# Patient Record
Sex: FEMALE | Race: WHITE | HISPANIC OR LATINO | URBAN - METROPOLITAN AREA
[De-identification: names, ages, dates, MRNs, and addresses within clinical notes are randomized per-mention and may not be internally consistent; named-entity substitution may affect disease eponyms.]

---

## 2024-05-13 ENCOUNTER — APPOINTMENT (OUTPATIENT)
Dept: URGENT CARE | Facility: MEDICAL CENTER | Age: 54
End: 2024-05-13

## 2024-05-13 ENCOUNTER — APPOINTMENT (OUTPATIENT)
Dept: LAB | Facility: MEDICAL CENTER | Age: 54
End: 2024-05-13

## 2024-09-05 ENCOUNTER — HOSPITAL ENCOUNTER (OUTPATIENT)
Dept: RADIOLOGY | Facility: HOSPITAL | Age: 54
Discharge: HOME | End: 2024-09-05
Payer: COMMERCIAL

## 2024-09-05 ENCOUNTER — OFFICE VISIT (OUTPATIENT)
Dept: INTERNAL MEDICINE CLINIC | Facility: CLINIC | Age: 54
End: 2024-09-05
Payer: COMMERCIAL

## 2024-09-05 VITALS
WEIGHT: 140.2 LBS | HEIGHT: 65 IN | SYSTOLIC BLOOD PRESSURE: 122 MMHG | BODY MASS INDEX: 23.36 KG/M2 | DIASTOLIC BLOOD PRESSURE: 80 MMHG | TEMPERATURE: 96.4 F | OXYGEN SATURATION: 96 % | HEART RATE: 96 BPM

## 2024-09-05 DIAGNOSIS — G43.009 MIGRAINE WITHOUT AURA, NOT REFRACTORY: ICD-10-CM

## 2024-09-05 DIAGNOSIS — E03.9 ACQUIRED HYPOTHYROIDISM: ICD-10-CM

## 2024-09-05 DIAGNOSIS — M25.511 CHRONIC PAIN OF BOTH SHOULDERS: Primary | ICD-10-CM

## 2024-09-05 DIAGNOSIS — M47.812 SPONDYLOSIS OF CERVICAL REGION WITHOUT MYELOPATHY OR RADICULOPATHY: ICD-10-CM

## 2024-09-05 DIAGNOSIS — G89.29 CHRONIC PAIN OF BOTH SHOULDERS: Primary | ICD-10-CM

## 2024-09-05 DIAGNOSIS — M25.511 CHRONIC PAIN OF BOTH SHOULDERS: ICD-10-CM

## 2024-09-05 DIAGNOSIS — M81.0 AGE-RELATED OSTEOPOROSIS WITHOUT CURRENT PATHOLOGICAL FRACTURE: ICD-10-CM

## 2024-09-05 DIAGNOSIS — Z79.899 ENCOUNTER FOR LONG-TERM CURRENT USE OF MEDICATION: ICD-10-CM

## 2024-09-05 DIAGNOSIS — M25.512 CHRONIC PAIN OF BOTH SHOULDERS: Primary | ICD-10-CM

## 2024-09-05 DIAGNOSIS — M25.512 CHRONIC PAIN OF BOTH SHOULDERS: ICD-10-CM

## 2024-09-05 DIAGNOSIS — N60.02 BILATERAL BREAST CYSTS: ICD-10-CM

## 2024-09-05 DIAGNOSIS — N60.01 BILATERAL BREAST CYSTS: ICD-10-CM

## 2024-09-05 DIAGNOSIS — G89.29 CHRONIC PAIN OF BOTH SHOULDERS: ICD-10-CM

## 2024-09-05 DIAGNOSIS — Z00.00 LABORATORY EXAMINATION ORDERED AS PART OF A ROUTINE GENERAL MEDICAL EXAMINATION: ICD-10-CM

## 2024-09-05 DIAGNOSIS — Z71.9 HEALTH COUNSELING: ICD-10-CM

## 2024-09-05 DIAGNOSIS — Z12.31 ENCOUNTER FOR SCREENING MAMMOGRAM FOR BREAST CANCER: ICD-10-CM

## 2024-09-05 DIAGNOSIS — M51.36 LUMBAR DEGENERATIVE DISC DISEASE: ICD-10-CM

## 2024-09-05 PROBLEM — I73.00 RAYNAUD'S DISEASE WITHOUT GANGRENE: Status: ACTIVE | Noted: 2021-11-29

## 2024-09-05 PROBLEM — Z90.710 HISTORY OF TOTAL HYSTERECTOMY: Status: ACTIVE | Noted: 2017-08-16

## 2024-09-05 PROBLEM — Z98.890 S/P BILATERAL BLEPHAROPLASTY: Status: ACTIVE | Noted: 2023-04-19

## 2024-09-05 PROBLEM — M51.369 LUMBAR DEGENERATIVE DISC DISEASE: Status: ACTIVE | Noted: 2024-09-05

## 2024-09-05 PROCEDURE — 99204 OFFICE O/P NEW MOD 45 MIN: CPT | Performed by: INTERNAL MEDICINE

## 2024-09-05 PROCEDURE — 73030 X-RAY EXAM OF SHOULDER: CPT

## 2024-09-05 RX ORDER — RIZATRIPTAN BENZOATE 5 MG/1
5 TABLET ORAL ONCE AS NEEDED
Qty: 9 TABLET | Refills: 0 | Status: SHIPPED | OUTPATIENT
Start: 2024-09-05

## 2024-09-05 RX ORDER — LEVOTHYROXINE SODIUM 125 UG/1
125 TABLET ORAL DAILY
Qty: 90 TABLET | Refills: 0 | Status: SHIPPED | OUTPATIENT
Start: 2024-09-05

## 2024-09-05 RX ORDER — ALENDRONATE SODIUM 70 MG/1
70 TABLET ORAL WEEKLY
COMMUNITY
Start: 2023-11-03 | End: 2024-11-02

## 2024-09-05 RX ORDER — LEVOTHYROXINE SODIUM 125 MCG
TABLET ORAL
COMMUNITY
Start: 1993-07-05 | End: 2024-09-05

## 2024-09-05 NOTE — PROGRESS NOTES
Ambulatory Visit  Name: Yesenia Newberry      : 1970      MRN: [id number]  Encounter Provider: Sabrina Joel MD  Encounter Date: 2024   Encounter department: [facility]    Assessment & Plan   1. Chronic pain of both shoulders  Assessment & Plan:  Differential Dx: OA, tendonitis, rotator cuff tear, overuse.  Refer to physical therapy.  Discussed Ortho referral.  Orders:  -     XR shoulder 2+ vw right; Future; Expected date: 2024  -     XR shoulder 2+ vw left; Future; Expected date: 2024  -     Ambulatory Referral to Physical Therapy; Future  2. Age-related osteoporosis without current pathological fracture  Assessment & Plan:  On alendronate.  Continue daily walks and supplements.  Orders:  -     Comprehensive metabolic panel; Future; Expected date: 2025  -     Vitamin D 25 hydroxy; Future; Expected date: 2025  -     DXA bone density spine hip and pelvis; Future; Expected date: 2025  3. Migraine without aura, not refractory  Assessment & Plan:  Previously taking notriptyline, stopped a week ago.  Monitor headaches.  Change rescue medication to Maxalt, as requested.  Recommend to start Mg, B2.  Orders:  -     rizatriptan (MAXALT) 5 mg tablet; Take 1 tablet (5 mg total) by mouth once as needed for migraine may repeat in 2 hours if necessary  4. Acquired hypothyroidism  Assessment & Plan:  Adequately replaced.  Orders:  -     CBC and differential; Future; Expected date: 2025  -     Lipid panel; Future; Expected date: 2025  -     TSH, 3rd generation with Free T4 reflex; Future; Expected date: 2025  -     levothyroxine 125 mcg tablet; Take 1 tablet (125 mcg total) by mouth daily  5. Bilateral breast cysts  -     Mammo diagnostic bilateral w 3d and cad; Future  -     US breast bilateral limited (diagnostic); Future; Expected date: 2024  6. Lumbar degenerative disc disease  Assessment & Plan:  No recent symptoms.  Recommend  regular stretching exercises.  7. Spondylosis of cervical region without myelopathy or radiculopathy  Assessment & Plan:  As above.  8. Health counseling  Comments:  Colonoscopy updated, will check records.  9. Encounter for long-term current use of medication  -     Vitamin B12; Future; Expected date: 01/01/2025  10. Laboratory examination ordered as part of a routine general medical examination  -     CBC and differential; Future; Expected date: 01/01/2025  -     Comprehensive metabolic panel; Future; Expected date: 01/01/2025  -     Lipid panel; Future; Expected date: 01/01/2025  -     TSH, 3rd generation with Free T4 reflex; Future; Expected date: 01/01/2025  -     Vitamin B12; Future; Expected date: 01/01/2025  -     Vitamin D 25 hydroxy; Future; Expected date: 01/01/2025  -     Hemoglobin A1C; Future; Expected date: 01/01/2025  11. Encounter for screening mammogram for breast cancer  -     Mammo diagnostic bilateral w 3d and cad; Future  -     US breast bilateral limited (diagnostic); Future; Expected date: 09/05/2024    Follow up in 1 year or as needed.       History of Present Illness     She complains of bilateral shoulder pain. This started almost a year ago.  She thinks it is due to her repetitive motions. She hurt her right shoulder first then her left shoulder a few months later. She has full range of motion but has pain with certain movements. No known injury or falls. She did not have it checked then, says it is not that bothersome.    She reports having migraines when she was young, disappeared during pregnancy. She started having headaches again during menopause. She was started on nortriptyline which had helped. She ran out about a week ago, no headaches yet. She is asking if she still needs the nortriptyline.   She reports her hot flashes are better, sleeping better.    She is requesting a diagnostic mammogram with ultrasounds for both breasts.  She has a history of multiple cysts, always does  this every year so she does not need to be recalled frequently.    Her family has osteoporosis, diagnosed with it recently as well.  She started alendronate earlier this year.      Review of Systems   Constitutional:  Negative for appetite change and fatigue.   HENT:  Negative for congestion, ear pain and postnasal drip.    Eyes:  Negative for visual disturbance.   Respiratory:  Negative for cough and shortness of breath.    Cardiovascular:  Negative for chest pain and leg swelling.   Gastrointestinal:  Negative for abdominal pain, constipation and diarrhea.   Genitourinary:  Negative for dysuria, frequency and urgency.   Musculoskeletal:  Positive for arthralgias. Negative for myalgias.   Skin:  Negative for rash and wound.   Neurological:  Negative for dizziness, numbness and headaches.   Hematological:  Does not bruise/bleed easily.   Psychiatric/Behavioral:  Negative for confusion. The patient is not nervous/anxious.        Past Medical History:   Diagnosis Date   • Arthritis    • Disease of thyroid gland     Hypothyroidism   • Headache(784.0)     Migraine     Past Surgical History:   Procedure Laterality Date   • HYSTERECTOMY       Family History   Problem Relation Age of Onset   • Thyroid disease Mother    • COPD Mother    • Arthritis Mother    • Glaucoma Mother    • Lung cancer Mother    • Thyroid disease Daughter    • Asthma Daughter    • Hearing loss Maternal Grandmother    • Diabetes Maternal Grandfather    • Hypertension Maternal Grandfather    • Breast cancer Paternal Aunt      Social History     Socioeconomic History   • Marital status: /Civil Union     Spouse name: Not on file   • Number of children: Not on file   • Years of education: Not on file   • Highest education level: Not on file   Occupational History   • Not on file   Tobacco Use   • Smoking status: Never     Passive exposure: Never   • Smokeless tobacco: Never   Substance and Sexual Activity   • Alcohol use: Yes     Alcohol/week: 2.0  standard drinks of alcohol     Types: 2 Glasses of wine per week     Comment: Socially   • Drug use: Never   • Sexual activity: Yes     Partners: Male     Birth control/protection: Post-menopausal   Other Topics Concern   • Not on file   Social History Narrative        3 daughters, 3 grand daughters    2 dogs, cat    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     Social Determinants of Health     Financial Resource Strain: Low Risk  (4/18/2023)    [facility]    Overall Financial Resource Strain (CARDIA)    • Difficulty of Paying Living Expenses: Not hard at all   Food Insecurity: No Food Insecurity (4/18/2023)    [facility]    Hunger Vital Sign    • Worried About Running Out of Food in the Last Year: Never true    • Ran Out of Food in the Last Year: Never true   Transportation Needs: No Transportation Needs (4/18/2023)    [facility]    PRAPARE - Transportation    • Lack of Transportation (Medical): No    • Lack of Transportation (Non-Medical): No   Physical Activity: Not on file   Stress: No Stress Concern Present (4/18/2023)    [facility]    [employer] - Occupational Stress Questionnaire    • Feeling of Stress : Not at all   Social Connections: Unknown (4/18/2023)    [facility]    Social Connection and Isolation Panel [NHANES]    • Frequency of Communication with Friends and Family: More than three times a week    • Frequency of Social Gatherings with Friends and Family: More than three times a week    • Attends Restorationist Services: Patient declined    • Active Member of Clubs or Organizations: No    • Attends Club or Organization Meetings: Never    • Marital Status:    Intimate Partner  "Violence: Not At Risk (4/18/2023)    [facility]    Humiliation, Afraid, Rape, and Kick questionnaire    • Fear of Current or Ex-Partner: No    • Emotionally Abused: No    • Physically Abused: No    • Sexually Abused: No   Housing Stability: Low Risk  (4/18/2023)    [facility]    Housing Stability Vital Sign    • Unable to Pay for Housing in the Last Year: No    • Number of Places Lived in the Last Year: 1    • Unstable Housing in the Last Year: No       Current Outpatient Medications:   •  alendronate (FOSAMAX) 70 mg tablet, Take 70 mg by mouth Once a week, Disp: , Rfl:   •  levothyroxine 125 mcg tablet, Take 1 tablet (125 mcg total) by mouth daily, Disp: 90 tablet, Rfl: 0  •  rizatriptan (MAXALT) 5 mg tablet, Take 1 tablet (5 mg total) by mouth once as needed for migraine may repeat in 2 hours if necessary, Disp: 9 tablet, Rfl: 0  Allergies   Allergen Reactions   • Sulfa Antibiotics Eye Swelling, Facial Swelling, Lip Swelling and Rash     Abnormal labs  Wbc drops, neutrophils dropped   lvhcc   • Sulfamethoxazole-Trimethoprim Rash     rash,eye, mouth swelling, labs elevated         Objective     /80   Pulse 96   Temp (!) 96.4 °F (35.8 °C)   Ht 5' 5\" (1.651 m)   Wt 63.6 kg (140 lb 3.2 oz)   SpO2 96%   BMI 23.33 kg/m²     Physical Exam  Vitals and nursing note reviewed.   Constitutional:       General: She is not in acute distress.     Appearance: She is well-developed.   HENT:      Head: Normocephalic and atraumatic.      Right Ear: Tympanic membrane, ear canal and external ear normal.      Left Ear: Tympanic membrane, ear canal and external ear normal.      Mouth/Throat:      Mouth: Mucous membranes are moist.   Eyes:      Pupils: Pupils are equal, round, and reactive to light.   Cardiovascular:      Rate and Rhythm: Normal rate and regular rhythm.      Heart sounds: Normal heart " sounds.   Pulmonary:      Effort: Pulmonary effort is normal.      Breath sounds: Normal breath sounds. No wheezing.   Abdominal:      General: Bowel sounds are normal.      Palpations: Abdomen is soft.   Musculoskeletal:         General: No swelling.      Right shoulder: No tenderness or bony tenderness. Decreased range of motion.      Left shoulder: No tenderness or bony tenderness. Decreased range of motion.      Cervical back: Spasms present. No tenderness. No pain with movement.      Thoracic back: No spasms or tenderness.      Right lower leg: No edema.      Left lower leg: No edema.   Skin:     General: Skin is warm.      Findings: No rash.   Neurological:      General: No focal deficit present.      Mental Status: She is alert and oriented to person, place, and time.   Psychiatric:         Mood and Affect: Mood and affect normal. Mood is not anxious or depressed.         Behavior: Behavior normal.       Administrative Statements       Reviewed available records.

## 2024-09-05 NOTE — ASSESSMENT & PLAN NOTE
Differential Dx: OA, tendonitis, rotator cuff tear, overuse.  Refer to physical therapy.  Discussed Ortho referral.

## 2024-09-05 NOTE — ASSESSMENT & PLAN NOTE
Previously taking notriptyline, stopped a week ago.  Monitor headaches.  Change rescue medication to Maxalt, as requested.  Recommend to start Mg, B2.

## 2024-09-05 NOTE — PATIENT INSTRUCTIONS
For migraine headaches:    Start magnesium oxide 400 mg twice a day.  Start vitamin B2 100 mg twice a day.

## 2024-09-12 ENCOUNTER — EVALUATION (OUTPATIENT)
Dept: PHYSICAL THERAPY | Facility: CLINIC | Age: 54
End: 2024-09-12
Payer: COMMERCIAL

## 2024-09-12 DIAGNOSIS — S49.91XD INJURY OF BOTH SHOULDERS, SUBSEQUENT ENCOUNTER: Primary | ICD-10-CM

## 2024-09-12 DIAGNOSIS — S49.92XD INJURY OF BOTH SHOULDERS, SUBSEQUENT ENCOUNTER: Primary | ICD-10-CM

## 2024-09-12 PROCEDURE — 97162 PT EVAL MOD COMPLEX 30 MIN: CPT

## 2024-09-12 PROCEDURE — 97530 THERAPEUTIC ACTIVITIES: CPT

## 2024-09-12 NOTE — PROGRESS NOTES
PT Evaluation     Today's date: 2024  Patient name: Yesenia Newberry  : 1970  MRN: [id number]  Referring provider: Sabrina Joel MD  Dx:   Encounter Diagnosis     ICD-10-CM    1. Injury of both shoulders, subsequent encounter  S49.91XD     S49.92XD                      Assessment  Impairments: abnormal coordination, abnormal muscle firing, abnormal or restricted ROM, abnormal movement, activity intolerance, impaired physical strength, lacks appropriate home exercise program, pain with function and scapular dyskinesis    Assessment details: Yesenia Newberry is a pleasant 53 y.o. female presents with signs and symptoms consistent with:   Rotator cuff tendinopathy     Problem List:  1) Hypomobility of posterior capsule   2) impaired motor control    Comparable signs:  1) reaching behind back  2) reaching overhead    she has hypomobility of posterior capsule, impaired motor control and increased tenderness at deltoid insertion indicative of a rotator cuff tendonosis resulting in worry over not knowing what's wrong, wanting to avoid painkillers, wanting to avoid surgery, and fear of not being able to keep active. These impairments listed above are preventing the patient from participating in functional activity. No further referral appears necessary at this time based upon examination results, and is negative for any red flags. Prognosis is good given HEP compliance and attendance to physical therapy 2x a week.  Positive prognostic indicators include positive attitude toward recovery and good understanding of diagnosis and treatment plan options.  Negative prognostic indicators include hypothyroidism.  Patent will benefit from skilled physical therapy at this time to address deficits to improve overall function and return to PLOF. Patient verbalized understanding of POC, HEP, and return demonstrated HEP. All questions were answered to patients satisfaction.     Please contact me if you have any questions  or recommendations. Thank you for the referral and the opportunity to share in Yesenia Newberry's care.        Understanding of Dx/Px/POC: good     Prognosis: good    Goals  Impairment Goals 4-6 weeks  In order to improve and return to PLOF patient will be able to...  - Decrease pain frequency/intensity/duration to 2/10  - Demonstrate symmetrical shoulder ROM with non involved shoulder without compensations  - Increase shoulder strength to 5/5 throughout  - Increase scapular strength to 5/5 throughout      Functional Goals 6-8 weeks  In order to improve and return to PLOF will be able to...  - Participate in functional activities with no greater than 2/10 pain.  - Increase Functional Status Measure (FOTO) to: predicted outcomes  - Be independent and compliant with HEP  - Participate in functional activities with good motor control.  - Reach overhead without increased pain/compensation/difficulty  - Reach behind back without increased pain/compensation/difficulty   - Wash hair without increased pain/compensation/difficulty             Plan  Patient would benefit from: skilled PT  Planned modality interventions: cryotherapy and electrical stimulation/Russian stimulation    Planned therapy interventions: joint mobilization, manual therapy, neuromuscular re-education, patient education, strengthening, stretching, therapeutic activities, therapeutic exercise, home exercise program, functional ROM exercises and postural training    Frequency: 2x week (2-3x week)  Duration in weeks: 4  Treatment plan discussed with: patient      Subjective Evaluation    History of Present Illness  Mechanism of injury: Patient presents to PT today with bilateral shoulder pain. 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 She remembers in December she had done something to the R  shoulder during a movement and felt it afterward and then January she did the same thing to her left. L>R and notes challenge with reaching behind her back and reaching behind. She reports overhead is not bad, and no decrease in strength and pain in outside shoulder.   Patient Goals  Patient goals for therapy: decreased pain, independence with ADLs/IADLs and return to sport/leisure activities  Patient goal: make sure that it is nothing crazy and not doing more damage  Pain  Current pain ratin  At worst pain ratin  Location: outsdie of the shoulder  Quality: sharp  Aggravating factors: overhead activity    Hand dominance: right        Objective     Postural Observations  Seated posture: fair  Standing posture: fair      Tenderness     Left Shoulder   Tenderness in the supraspinatus tendon.     Right Shoulder  Tenderness in the supraspinatus tendon.     Cervical/Thoracic Screen   Cervical range of motion within normal limits  Thoracic range of motion within normal limits  Thoracic range of motion within normal limits with the following exceptions: Mild hypomobility in t/s extension    Neurological Testing     Sensation     Shoulder   Left Shoulder   Intact: light touch    Right Shoulder   Intact: Light touch    Reflexes   Left   Biceps (C5/C6): normal (2+)  Brachioradialis (C6): normal (2+)  Triceps (C7): normal (2+)  Huddleston's reflex: negative    Right   Biceps (C5/C6): normal (2+)  Brachioradialis (C6): normal (2+)  Triceps (C7): normal (2+)  Huddleston's reflex: negative    Active Range of Motion   Left Shoulder   Flexion: 160 degrees   Abduction: 160 degrees   External rotation 90°: 80 degrees   External rotation BTH: T3 with pain  Internal rotation 90°: 60 degrees   Internal rotation BTB: T7 with pain    Right Shoulder   Flexion: 160 degrees   Abduction: 160 degrees   External rotation 90°: 80 degrees  External rotation BTH: T3 with pain  Internal rotation 90°: 65 degrees   Internal rotation BTB: T7 with  pain    Joint Play   Left Shoulder  Hypomobile in the posterior capsule and thoracic spine.    Right Shoulder  Hypomobile in the posterior capsule and thoracic spine.     Strength/Myotome Testing     Left Shoulder     Planes of Motion   Flexion: 4   Abduction: 4   External rotation at 0°: 4   Internal rotation at 0°: 4     Isolated Muscles   Biceps: 5   Lower trapezius: 4   Middle trapezius: 4   Serratus anterior: 4     Right Shoulder     Planes of Motion   Flexion: 4+   Abduction: 4-   External rotation at 0°: 4   Internal rotation at 0°: 4     Isolated Muscles   Biceps: 5   Lower trapezius: 4   Middle trapezius: 4   Serratus anterior: 4     Tests     Left Shoulder   Positive Hawkin's.   Negative belly press, drop arm, empty can, external rotation lag sign, full can, horn blower, internal rotation lag sign, Speed's, ULTT1, ULTT2, ULTT3 and ULTT4.     Right Shoulder   Positive Hawkin's.   Negative belly press, drop arm, empty can, external rotation lag sign, full can, Speed's, ULTT1, ULTT2, ULTT3 and ULTT4.                POC Expires Auth Status Start Date Expiration Date PT Visit Limit    10/12 N/a 9/12 10/12 BOMN   Date        Used        Remaining           Diagnosis:  Bilateral RTC tendinopathy    Precautions:  None   Comparable signs 1) reaching behind  2) overhead   Primary Impairments: 1) posterior capsule  2) Impaired motor control    Patient Goals Make sure nothing is wrong with it   Manual Therapy  9/12        Post capsule                                    Re-evaluation          Exercise Diary          Therapeutic Exercise         Strap stretch         Sleeper stretch         T/s extension                                             Neuromuscular Re-education         ER to the lord          Serratus slides                                                               Therapeutic Activities         Education  POC, diagnosis, expecations                                            Modalities

## 2024-09-20 ENCOUNTER — APPOINTMENT (OUTPATIENT)
Dept: PHYSICAL THERAPY | Facility: CLINIC | Age: 54
End: 2024-09-20
Payer: COMMERCIAL

## 2024-09-26 ENCOUNTER — OFFICE VISIT (OUTPATIENT)
Dept: PHYSICAL THERAPY | Facility: CLINIC | Age: 54
End: 2024-09-26
Payer: COMMERCIAL

## 2024-09-26 DIAGNOSIS — M25.511 CHRONIC PAIN OF BOTH SHOULDERS: ICD-10-CM

## 2024-09-26 DIAGNOSIS — S49.92XD INJURY OF BOTH SHOULDERS, SUBSEQUENT ENCOUNTER: Primary | ICD-10-CM

## 2024-09-26 DIAGNOSIS — S49.91XD INJURY OF BOTH SHOULDERS, SUBSEQUENT ENCOUNTER: Primary | ICD-10-CM

## 2024-09-26 DIAGNOSIS — M25.512 CHRONIC PAIN OF BOTH SHOULDERS: ICD-10-CM

## 2024-09-26 DIAGNOSIS — G89.29 CHRONIC PAIN OF BOTH SHOULDERS: ICD-10-CM

## 2024-09-26 PROCEDURE — 97112 NEUROMUSCULAR REEDUCATION: CPT

## 2024-09-26 PROCEDURE — 97110 THERAPEUTIC EXERCISES: CPT

## 2024-09-26 PROCEDURE — 97530 THERAPEUTIC ACTIVITIES: CPT

## 2024-09-26 NOTE — PROGRESS NOTES
"Daily Note     Today's date: 2024  Patient name: Yesenia Newberry  : 1970  MRN: [id number]  Referring provider: Sabrina Joel MD  Dx:   Encounter Diagnosis     ICD-10-CM    1. Injury of both shoulders, subsequent encounter  S49.91XD     S49.92XD       2. Chronic pain of both shoulders  M25.511 Ambulatory Referral to Physical Therapy    G89.29     M25.512                      Subjective: Patient reports doing the home program is feeling better.       Objective: See treatment diary below      Assessment: Tolerated treatment well. Patient demonstrated fatigue post treatment, exhibited good technique with therapeutic exercises, and would benefit from continued PT Patient HEP updated for home and will be d/c from skilled PT at this time. She would just prefer to work with HEP. Reviewed and educated on HEP with good return demos.       Plan: Continue per plan of care.  Progress treatment as tolerated.         POC Expires Auth Status Start Date Expiration Date PT Visit Limit    10/12 N/a 9/12 10/12 BOMN   Date        Used        Remaining           Diagnosis:  Bilateral RTC tendinopathy    Precautions:  None   Comparable signs 1) reaching behind  2) overhead   Primary Impairments: 1) posterior capsule  2) Impaired motor control    Patient Goals Make sure nothing is wrong with it   Manual Therapy         Post capsule                                    Re-evaluation          Exercise Diary          Therapeutic Exercise         Strap stretch  2x10 tamra       Sleeper stretch  10x10\" tamra       T/s extension  20x 5\"                                           Neuromuscular Re-education         ER to the lord   2x10       Serratus slides  2x10                                                             Therapeutic Activities         Education  POC, diagnosis, expecations HEP                                            Modalities                            "

## 2024-10-04 DIAGNOSIS — M81.0 AGE-RELATED OSTEOPOROSIS WITHOUT CURRENT PATHOLOGICAL FRACTURE: Primary | ICD-10-CM

## 2024-10-04 RX ORDER — ALENDRONATE SODIUM 70 MG/1
70 TABLET ORAL
Qty: 12 TABLET | Refills: 1 | Status: SHIPPED | OUTPATIENT
Start: 2024-10-04 | End: 2025-10-04

## 2024-10-04 NOTE — TELEPHONE ENCOUNTER
Medication Refill Request     Name Fosamax   Dose/Frequency 70 mg, take 1 tab once a week  Quantity 4  Verified pharmacy   [x]  Verified ordering Provider   [x]  Does patient have enough for the next 3 days? Yes [] No [x] First request was sent to wrong office

## 2024-12-29 DIAGNOSIS — E03.9 ACQUIRED HYPOTHYROIDISM: ICD-10-CM

## 2024-12-30 RX ORDER — LEVOTHYROXINE SODIUM 125 UG/1
125 TABLET ORAL DAILY
Qty: 90 TABLET | Refills: 1 | Status: SHIPPED | OUTPATIENT
Start: 2024-12-30

## 2025-01-06 DIAGNOSIS — M81.0 AGE-RELATED OSTEOPOROSIS WITHOUT CURRENT PATHOLOGICAL FRACTURE: ICD-10-CM

## 2025-01-07 RX ORDER — ALENDRONATE SODIUM 70 MG/1
70 TABLET ORAL
Qty: 12 TABLET | Refills: 0 | Status: SHIPPED | OUTPATIENT
Start: 2025-01-07 | End: 2026-01-07

## 2025-03-21 DIAGNOSIS — M81.0 AGE-RELATED OSTEOPOROSIS WITHOUT CURRENT PATHOLOGICAL FRACTURE: ICD-10-CM

## 2025-03-21 RX ORDER — ALENDRONATE SODIUM 70 MG/1
70 TABLET ORAL
Qty: 12 TABLET | Refills: 1 | Status: SHIPPED | OUTPATIENT
Start: 2025-03-21 | End: 2026-03-21

## 2025-03-25 ENCOUNTER — OFFICE VISIT (OUTPATIENT)
Dept: OBGYN CLINIC | Facility: CLINIC | Age: 55
End: 2025-03-25
Payer: COMMERCIAL

## 2025-03-25 VITALS — HEIGHT: 65 IN | BODY MASS INDEX: 22.16 KG/M2 | WEIGHT: 133 LBS

## 2025-03-25 DIAGNOSIS — G89.29 CHRONIC RIGHT SHOULDER PAIN: ICD-10-CM

## 2025-03-25 DIAGNOSIS — M12.811 RIGHT ROTATOR CUFF TEAR ARTHROPATHY: Primary | ICD-10-CM

## 2025-03-25 DIAGNOSIS — M75.101 RIGHT ROTATOR CUFF TEAR ARTHROPATHY: Primary | ICD-10-CM

## 2025-03-25 DIAGNOSIS — M25.511 CHRONIC RIGHT SHOULDER PAIN: ICD-10-CM

## 2025-03-25 PROCEDURE — 99204 OFFICE O/P NEW MOD 45 MIN: CPT | Performed by: PHYSICAL MEDICINE & REHABILITATION

## 2025-03-25 NOTE — PROGRESS NOTES
1. Right rotator cuff tear arthropathy    2. Chronic right shoulder pain      Orders Placed This Encounter   Procedures    MRI shoulder right wo contrast     No orders of the defined types were placed in this encounter.      Impression:  Right shoulder pain likely secondary to partial rotator cuff tear and bicep tendinitis.  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  She has tried activity modification, physical therapy and home exercise program.  She continues to be significantly impacted with this with range of motion limitation and a positive drop arm.  In light of this, we will obtain an MRI of her right shoulder.  Patient wants to hold off on medication for now.  I will see her back for MRI review.    Imaging Studies (I personally reviewed images in PACS and report):  Right shoulder x-rays most recent to this encounter reviewed.  These images show minimal glenohumeral joint osteoarthritis.    No follow-ups on file.    Patient is in agreement with the above plan.    HPI:  Yesenia Newberry is a 54 y.o. female  who presents for evaluation of   Chief Complaint   Patient presents with    Right Arm - Pain     Patient has pain at the base of her shoulder the goes to the inside of her elbow. Patient had an injury last year in the same shoulder.       Onset/Mechanism: Chronic pain for over 3 months.  No recent injury.  Location: In the arm.  Radiation: Down the arm.  Provocative: Reaching motions when it first started.  Severity: Pain that is not improving.  Associated Symptoms: Had left shoulder pain but this has resolved.  Treatment so far: Physical therapy which had helped initially.    Following history reviewed and updated:  Past Medical History:   Diagnosis Date    Arthritis     Disease of thyroid gland     Hypothyroidism    Headache(784.0)     Migraine     Past Surgical History:   Procedure Laterality Date    HYSTERECTOMY      US BREAST CYST ASPIRATION LEFT  "INITIAL Left 1/25/2012     Social History   Social History     Substance and Sexual Activity   Alcohol Use Yes    Alcohol/week: 2.0 standard drinks of alcohol    Types: 2 Glasses of wine per week    Comment: Socially     Social History     Substance and Sexual Activity   Drug Use Never     Social History     Tobacco Use   Smoking Status Never    Passive exposure: Never   Smokeless Tobacco Never     Family History   Problem Relation Age of Onset    Thyroid disease Mother     COPD Mother     Arthritis Mother     Glaucoma Mother     Lung cancer Mother     Thyroid disease Daughter     Asthma Daughter     Hearing loss Maternal Grandmother     Diabetes Maternal Grandfather     Hypertension Maternal Grandfather     Breast cancer Paternal Aunt      Allergies   Allergen Reactions    Sulfa Antibiotics Eye Swelling, Facial Swelling, Lip Swelling and Rash     Abnormal labs  Wbc drops, neutrophils dropped   lvhcc    Sulfamethoxazole-Trimethoprim Rash     rash,eye, mouth swelling, labs elevated        Constitutional:  Ht 5' 5\" (1.651 m)   Wt 60.3 kg (133 lb)   BMI 22.13 kg/m²    General: NAD.  Eyes: Anicteric sclerae.  Neck: Supple.  Lungs: Unlabored breathing.  Cardiovascular: No lower extremity edema.  Skin: Intact without erythema.  Neurologic: Sensation intact to light touch.  Psychiatric: Mood and affect are appropriate.    Right Shoulder Exam     Tenderness   The patient is experiencing tenderness in the acromion and biceps tendon.    Range of Motion   Active abduction:  100 abnormal   External rotation:  abnormal   Forward flexion:  130 abnormal     Tests   Kathleen test: positive  Impingement: positive  Drop arm: positive    Other   Erythema: absent  Scars: absent  Sensation: normal  Pulse: present             Procedures              "

## 2025-03-26 DIAGNOSIS — M12.811 RIGHT ROTATOR CUFF TEAR ARTHROPATHY: Primary | ICD-10-CM

## 2025-03-26 DIAGNOSIS — M75.101 RIGHT ROTATOR CUFF TEAR ARTHROPATHY: Primary | ICD-10-CM

## 2025-03-26 RX ORDER — MELOXICAM 15 MG/1
15 TABLET ORAL DAILY PRN
Qty: 60 TABLET | Refills: 0 | Status: SHIPPED | OUTPATIENT
Start: 2025-03-26

## 2025-03-28 ENCOUNTER — TELEPHONE (OUTPATIENT)
Dept: OBGYN CLINIC | Facility: HOSPITAL | Age: 55
End: 2025-03-28

## 2025-03-28 ENCOUNTER — HOSPITAL ENCOUNTER (OUTPATIENT)
Dept: RADIOLOGY | Facility: IMAGING CENTER | Age: 55
End: 2025-03-28
Payer: COMMERCIAL

## 2025-03-28 DIAGNOSIS — M75.101 RIGHT ROTATOR CUFF TEAR ARTHROPATHY: ICD-10-CM

## 2025-03-28 DIAGNOSIS — M12.811 RIGHT ROTATOR CUFF TEAR ARTHROPATHY: ICD-10-CM

## 2025-03-28 PROCEDURE — 73221 MRI JOINT UPR EXTREM W/O DYE: CPT

## 2025-03-28 NOTE — TELEPHONE ENCOUNTER
Caller: Leti    Doctor: Dr. Lopes    Reason for call: Patient received her results for her Shoulder MRI, she stated it says she has a tear. Wanted to get in with you sooner. I advised you have nothing sooner, I advised since I am not a nurse I would leave a message to see if you can look. I advised if that is the case Moses is not a surgeon and I will send a message to see who you would want her to FU with.     Patient would like a call back.     Call back#: [phone]

## 2025-03-31 ENCOUNTER — OFFICE VISIT (OUTPATIENT)
Dept: OBGYN CLINIC | Facility: CLINIC | Age: 55
End: 2025-03-31
Payer: COMMERCIAL

## 2025-03-31 VITALS — HEIGHT: 65 IN | BODY MASS INDEX: 22.16 KG/M2 | WEIGHT: 133 LBS

## 2025-03-31 DIAGNOSIS — G89.29 CHRONIC RIGHT SHOULDER PAIN: Primary | ICD-10-CM

## 2025-03-31 DIAGNOSIS — M25.511 CHRONIC RIGHT SHOULDER PAIN: Primary | ICD-10-CM

## 2025-03-31 PROCEDURE — 20610 DRAIN/INJ JOINT/BURSA W/O US: CPT | Performed by: PHYSICAL MEDICINE & REHABILITATION

## 2025-03-31 PROCEDURE — 99213 OFFICE O/P EST LOW 20 MIN: CPT | Performed by: PHYSICAL MEDICINE & REHABILITATION

## 2025-03-31 RX ORDER — TRIAMCINOLONE ACETONIDE 40 MG/ML
80 INJECTION, SUSPENSION INTRA-ARTICULAR; INTRAMUSCULAR
Status: COMPLETED | OUTPATIENT
Start: 2025-03-31 | End: 2025-03-31

## 2025-03-31 RX ORDER — PREDNISONE 20 MG/1
40 TABLET ORAL
Qty: 10 TABLET | Refills: 0 | Status: SHIPPED | OUTPATIENT
Start: 2025-03-31 | End: 2025-04-05

## 2025-03-31 RX ORDER — ROPIVACAINE HYDROCHLORIDE 5 MG/ML
10 INJECTION, SOLUTION EPIDURAL; INFILTRATION; PERINEURAL
Status: COMPLETED | OUTPATIENT
Start: 2025-03-31 | End: 2025-03-31

## 2025-03-31 RX ADMIN — ROPIVACAINE HYDROCHLORIDE 10 ML: 5 INJECTION, SOLUTION EPIDURAL; INFILTRATION; PERINEURAL at 13:30

## 2025-03-31 RX ADMIN — TRIAMCINOLONE ACETONIDE 80 MG: 40 INJECTION, SUSPENSION INTRA-ARTICULAR; INTRAMUSCULAR at 13:30

## 2025-03-31 NOTE — PROGRESS NOTES
"1. Chronic right shoulder pain  Large joint arthrocentesis    Ambulatory referral to Physical Therapy    predniSONE 20 mg tablet        Orders Placed This Encounter   Procedures    Large joint arthrocentesis    Ambulatory referral to Physical Therapy        Impression:  Patient is here in follow up of right shoulder pain likely secondary to rotator cuff tendinosis.  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  She has tried activity modification, physical therapy and home exercise program.  We reviewed her MRI today.  We proceeded with a subacromial space steroid injection.  Will have her start formal physical therapy.  I will see her back in 3-4 weeks to reassess.     Imaging Studies (I personally reviewed images in PACS and report):  Right shoulder x-rays most recent to this encounter reviewed.  These images show minimal glenohumeral joint osteoarthritis.    MRI right shoulder:  \"SUBCUTANEOUS TISSUES: Normal     JOINT EFFUSION: There is a small glenohumeral joint effusion.     ACROMION PROCESS: Normal.     ROTATOR CUFF: Subscapularis insertional tendinosis with a tear involving the inferior bundle of the subscapularis tendon exhibiting minimal retraction without secondary signs of chronicity. Prominent supraspinatus and infraspinatus insertional tendinosis   without tear.     SUBACROMIAL/SUBDELTOID BURSA: There is mild subacromial/subdeltoid bursitis.     LONG HEAD OF BICEPS TENDON: Normal.     GLENOID LABRUM: Mild degenerative fraying at the superior glenoid labrum with remainder of the labrum appearing intact.     GLENOHUMERAL JOINT: Moderate degenerative change.     ACROMIOCLAVICULAR JOINT: There is moderate osteoarthritis.     BONES: Normal.     IMPRESSION:     1.  Subscapularis, supraspinatus and infraspinatus insertional tendinosis with a tear of the inferior bundle of the subscapularis tendon exhibiting minimal tendon retraction without secondary signs " "of chronicity.  2.  Intact long head biceps tendon.  3.  Glenohumeral degenerative change with degenerative fraying at the superior glenoid labrum with remainder of the labrum appearing intact.\"    No follow-ups on file.    Patient is in agreement with the above plan.    HPI:  Yesenia Newberry is a 54 y.o. female  who presents in follow up.  Here for   Chief Complaint   Patient presents with    Right Arm - Pain, Follow-up       Since last visit: See above.    Following history reviewed and updated:  Past Medical History:   Diagnosis Date    Arthritis     Disease of thyroid gland     Hypothyroidism    Headache(784.0)     Migraine     Past Surgical History:   Procedure Laterality Date    HYSTERECTOMY      US BREAST CYST ASPIRATION LEFT INITIAL Left 1/25/2012     Social History   Social History     Substance and Sexual Activity   Alcohol Use Yes    Alcohol/week: 2.0 standard drinks of alcohol    Types: 2 Glasses of wine per week    Comment: Socially     Social History     Substance and Sexual Activity   Drug Use Never     Social History     Tobacco Use   Smoking Status Never    Passive exposure: Never   Smokeless Tobacco Never     Family History   Problem Relation Age of Onset    Thyroid disease Mother     COPD Mother     Arthritis Mother     Glaucoma Mother     Lung cancer Mother     Thyroid disease Daughter     Asthma Daughter     Hearing loss Maternal Grandmother     Diabetes Maternal Grandfather     Hypertension Maternal Grandfather     Breast cancer Paternal Aunt      Allergies   Allergen Reactions    Sulfa Antibiotics Eye Swelling, Facial Swelling, Lip Swelling and Rash     Abnormal labs  Wbc drops, neutrophils dropped   lvhcc    Sulfamethoxazole-Trimethoprim Rash     rash,eye, mouth swelling, labs elevated        Constitutional:  Ht 5' 4.96\" (1.65 m)   Wt 60.3 kg (133 lb)   BMI 22.16 kg/m²    General: NAD.  Eyes: Clear sclerae.  ENT: No inflammation, lesion, or mass of lips.  No tracheal " deviation.  Musculoskeletal: As mentioned below.  Integumentary: No visible rashes or skin lesions.  Pulmonary/Chest: Effort normal. No respiratory distress.   Neuro: CN's grossly intact, ADLER.  Psych: Normal affect and judgement.  Vascular: WWP.    Right Shoulder Exam     Tenderness   The patient is experiencing tenderness in the acromion.    Range of Motion   Active abduction:  abnormal   Passive abduction:  normal   Forward flexion:  abnormal     Tests   Kathleen test: positive  Impingement: positive    Other   Erythema: absent  Scars: absent  Sensation: normal  Pulse: present    Comments:  ROM improved after injection.             Large joint arthrocentesis  Universal Protocol:  procedure performed by consultantConsent: Verbal consent obtained. Written consent not obtained.  Risks and benefits: risks, benefits and alternatives were discussed  Consent given by: patient  Timeout called at: 3/31/2025 1:36 PM.  Patient understanding: patient states understanding of the procedure being performed  Site marked: the operative site was marked  Patient identity confirmed: verbally with patient  Supporting Documentation  Indications: pain   Procedure Details  Location: shoulder -   Needle gauge: 21G 2''  Ultrasound guidance: no  Approach: posterolateral  Medications administered: 80 mg triamcinolone acetonide 40 mg/mL; 10 mL ropivacaine 0.5 %    Patient tolerance: patient tolerated the procedure well with no immediate complications  Dressing:  Sterile dressing applied    There was little to no resistance encountered during the injection.    Risks of this procedure include:    - Risk of bleeding since a needle is involved.  - Risk of infection (1/10,000 chance as per recent studies).  Signs/symptoms were discussed and they would prompt an urgent evaluation at an emergency department.  - Risk of pigmentation or skin dimpling in the skin (2-3% chance as per recent studies) from the steroid.  - Risk of increased pain from  steroid flare (1% chance as per recent studies) that typically lasts 24-48 hours.  - Risk of increased blood sugars from the steroid medication that can last for a few weeks.  If the patient is a diabetic or pre-diabetic, they were encouraged to closely monitor their blood sugars and discuss with PCP if elevated more than usual or if having symptoms.    The benefits outweigh the risks and so the procedure was completed.

## 2025-04-02 ENCOUNTER — TELEPHONE (OUTPATIENT)
Dept: ADMINISTRATIVE | Facility: OTHER | Age: 55
End: 2025-04-02

## 2025-04-02 NOTE — TELEPHONE ENCOUNTER
Upon review of the In Basket request and the patient's chart, initial outreach has been made via fax to facility. Please see Contacts section for details.     Thank you  Keyur Zee MA

## 2025-04-02 NOTE — LETTER
Procedure Request Form: Colonoscopy      Date Requested: 25  Patient: Yesenia Notte  Patient : 1970   Referring Provider: Sabrina Joel MD        Date of Procedure ______________________________       The above patient has informed us that they have completed their   most recent Colonoscopy at your facility. Please complete   this form and attach all corresponding procedure reports/results.    Comments _Approx 202_________________________________________________________  ____________________________________________________________________  ____________________________________________________________________  ____________________________________________________________________    Facility Completing Procedure _________________________________________    Form Completed By (print name) _______________________________________      Signature __________________________________________________________      These reports are needed for  compliance.    Please fax this completed form and a copy of the procedure report to the [facility] Based Department as soon as possible via Fax [phone], xochitl Baer: Phone [phone]. Our office is located at [address] [place] [address].    We thank you for your assistance in treating our mutual patient.

## 2025-04-07 NOTE — TELEPHONE ENCOUNTER
Upon review of the In Basket request we were able to locate, review, and update the patient chart as requested for CRC: Colonoscopy.    Any additional questions or concerns should be emailed to the Practice Liaisons via the appropriate education email address, please do not reply via In Basket.    Thank you  Keyur Zee MA   [facility]

## 2025-04-22 ENCOUNTER — OFFICE VISIT (OUTPATIENT)
Dept: OBGYN CLINIC | Facility: CLINIC | Age: 55
End: 2025-04-22
Payer: COMMERCIAL

## 2025-04-22 VITALS — WEIGHT: 133 LBS | BODY MASS INDEX: 22.16 KG/M2 | HEIGHT: 65 IN

## 2025-04-22 DIAGNOSIS — M25.511 CHRONIC RIGHT SHOULDER PAIN: Primary | ICD-10-CM

## 2025-04-22 DIAGNOSIS — G89.29 CHRONIC RIGHT SHOULDER PAIN: Primary | ICD-10-CM

## 2025-04-22 PROCEDURE — 99213 OFFICE O/P EST LOW 20 MIN: CPT | Performed by: PHYSICAL MEDICINE & REHABILITATION

## 2025-04-22 NOTE — PROGRESS NOTES
"1. Chronic right shoulder pain          No orders of the defined types were placed in this encounter.       Impression:  Patient is here in follow up of right shoulder pain likely secondary to rotator cuff tendinosis.  Patient has had the symptoms over the past few months.  Treatment has included subacromial space steroid injection, activity modification, physical therapy and home exercise program.  She is now doing very well.  She has full strength with resisted shoulder internal rotation and external rotation.  She has full range of motion and minimal discomfort with impingement testing.  She has returned to all physical activity.  She is having some paresthesias in a radial nerve distribution along the forearm.  No motor deficits.  If continued symptoms, could consider hand therapy.  I will see her back if needed.     Imaging Studies (I personally reviewed images in PACS and report):  Right shoulder x-rays most recent to this encounter reviewed.  These images show minimal glenohumeral joint osteoarthritis.     MRI right shoulder:  \"SUBCUTANEOUS TISSUES: Normal     JOINT EFFUSION: There is a small glenohumeral joint effusion.     ACROMION PROCESS: Normal.     ROTATOR CUFF: Subscapularis insertional tendinosis with a tear involving the inferior bundle of the subscapularis tendon exhibiting minimal retraction without secondary signs of chronicity. Prominent supraspinatus and infraspinatus insertional tendinosis   without tear.     SUBACROMIAL/SUBDELTOID BURSA: There is mild subacromial/subdeltoid bursitis.     LONG HEAD OF BICEPS TENDON: Normal.     GLENOID LABRUM: Mild degenerative fraying at the superior glenoid labrum with remainder of the labrum appearing intact.     GLENOHUMERAL JOINT: Moderate degenerative change.     ACROMIOCLAVICULAR JOINT: There is moderate osteoarthritis.     BONES: Normal.     IMPRESSION:     1.  Subscapularis, supraspinatus and infraspinatus insertional tendinosis with a tear of the " "inferior bundle of the subscapularis tendon exhibiting minimal tendon retraction without secondary signs of chronicity.  2.  Intact long head biceps tendon.  3.  Glenohumeral degenerative change with degenerative fraying at the superior glenoid labrum with remainder of the labrum appearing intact.\"    No follow-ups on file.    Patient is in agreement with the above plan.    HPI:  Yesenia Newberry is a 54 y.o. female  who presents in follow up.  Here for   Chief Complaint   Patient presents with    Right Shoulder - Follow-up, Pain     Pt reports she is feeling much better, still has some intermittent pain        Since last visit: See above.    Following history reviewed and updated:  Past Medical History:   Diagnosis Date    Arthritis     Disease of thyroid gland     Hypothyroidism    Headache(784.0)     Migraine     Past Surgical History:   Procedure Laterality Date    HYSTERECTOMY      US BREAST CYST ASPIRATION LEFT INITIAL Left 1/25/2012     Social History   Social History     Substance and Sexual Activity   Alcohol Use Yes    Alcohol/week: 2.0 standard drinks of alcohol    Types: 2 Glasses of wine per week    Comment: Socially     Social History     Substance and Sexual Activity   Drug Use Never     Social History     Tobacco Use   Smoking Status Never    Passive exposure: Never   Smokeless Tobacco Never     Family History   Problem Relation Age of Onset    Thyroid disease Mother     COPD Mother     Arthritis Mother     Glaucoma Mother     Lung cancer Mother     Thyroid disease Daughter     Asthma Daughter     Hearing loss Maternal Grandmother     Diabetes Maternal Grandfather     Hypertension Maternal Grandfather     Breast cancer Paternal Aunt      Allergies   Allergen Reactions    Sulfa Antibiotics Eye Swelling, Facial Swelling, Lip Swelling and Rash     Abnormal labs  Wbc drops, neutrophils dropped   lvhcc    Sulfamethoxazole-Trimethoprim Rash     rash,eye, mouth swelling, labs elevated        Constitutional:  " "Ht 5' 4.96\" (1.65 m)   Wt 60.3 kg (133 lb)   BMI 22.16 kg/m²    General: NAD.  Eyes: Clear sclerae.  ENT: No inflammation, lesion, or mass of lips.  No tracheal deviation.  Musculoskeletal: As mentioned below.  Integumentary: No visible rashes or skin lesions.  Pulmonary/Chest: Effort normal. No respiratory distress.   Neuro: CN's grossly intact, ADLER.  Psych: Normal affect and judgement.  Vascular: WWP.    Right Shoulder Exam     Tenderness   The patient is experiencing no tenderness.    Range of Motion   The patient has normal right shoulder ROM.    Muscle Strength   The patient has normal right shoulder strength.    Other   Erythema: absent  Scars: absent  Sensation: normal  Pulse: present             Procedures  "

## 2025-05-15 ENCOUNTER — APPOINTMENT (OUTPATIENT)
Dept: LAB | Facility: CLINIC | Age: 55
End: 2025-05-15
Payer: COMMERCIAL

## 2025-05-15 ENCOUNTER — APPOINTMENT (OUTPATIENT)
Dept: LAB | Facility: CLINIC | Age: 55
End: 2025-05-15
Attending: INTERNAL MEDICINE
Payer: COMMERCIAL

## 2025-05-15 ENCOUNTER — TRANSCRIBE ORDERS (OUTPATIENT)
Dept: LAB | Facility: CLINIC | Age: 55
End: 2025-05-15

## 2025-05-15 DIAGNOSIS — Z79.899 ENCOUNTER FOR LONG-TERM CURRENT USE OF MEDICATION: ICD-10-CM

## 2025-05-15 DIAGNOSIS — Z00.8 HEALTH EXAMINATION IN POPULATION SURVEYS: ICD-10-CM

## 2025-05-15 DIAGNOSIS — E03.9 ACQUIRED HYPOTHYROIDISM: ICD-10-CM

## 2025-05-15 DIAGNOSIS — Z00.00 LABORATORY EXAMINATION ORDERED AS PART OF A ROUTINE GENERAL MEDICAL EXAMINATION: ICD-10-CM

## 2025-05-15 DIAGNOSIS — Z00.8 HEALTH EXAMINATION IN POPULATION SURVEYS: Primary | ICD-10-CM

## 2025-05-15 DIAGNOSIS — M81.0 AGE-RELATED OSTEOPOROSIS WITHOUT CURRENT PATHOLOGICAL FRACTURE: ICD-10-CM

## 2025-05-15 LAB
25(OH)D3 SERPL-MCNC: 32 NG/ML (ref 30–100)
ALBUMIN SERPL BCG-MCNC: 4.2 G/DL (ref 3.5–5)
ALP SERPL-CCNC: 67 U/L (ref 34–104)
ALT SERPL W P-5'-P-CCNC: 30 U/L (ref 7–52)
ANION GAP SERPL CALCULATED.3IONS-SCNC: 5 MMOL/L (ref 4–13)
AST SERPL W P-5'-P-CCNC: 19 U/L (ref 13–39)
BASOPHILS # BLD AUTO: 0.05 THOUSANDS/ÂΜL (ref 0–0.1)
BASOPHILS NFR BLD AUTO: 1 % (ref 0–1)
BILIRUB SERPL-MCNC: 0.47 MG/DL (ref 0.2–1)
BUN SERPL-MCNC: 12 MG/DL (ref 5–25)
CALCIUM SERPL-MCNC: 8.8 MG/DL (ref 8.4–10.2)
CHLORIDE SERPL-SCNC: 108 MMOL/L (ref 96–108)
CHOLEST SERPL-MCNC: 237 MG/DL (ref ?–200)
CHOLEST SERPL-MCNC: 242 MG/DL (ref ?–200)
CO2 SERPL-SCNC: 30 MMOL/L (ref 21–32)
CREAT SERPL-MCNC: 0.87 MG/DL (ref 0.6–1.3)
EOSINOPHIL # BLD AUTO: 0.19 THOUSAND/ÂΜL (ref 0–0.61)
EOSINOPHIL NFR BLD AUTO: 4 % (ref 0–6)
ERYTHROCYTE [DISTWIDTH] IN BLOOD BY AUTOMATED COUNT: 12.8 % (ref 11.6–15.1)
EST. AVERAGE GLUCOSE BLD GHB EST-MCNC: 140 MG/DL
EST. AVERAGE GLUCOSE BLD GHB EST-MCNC: 143 MG/DL
GFR SERPL CREATININE-BSD FRML MDRD: 74 ML/MIN/1.73SQ M
GLUCOSE P FAST SERPL-MCNC: 91 MG/DL (ref 65–99)
HBA1C MFR BLD: 6.5 %
HBA1C MFR BLD: 6.6 %
HCT VFR BLD AUTO: 42.3 % (ref 34.8–46.1)
HDLC SERPL-MCNC: 82 MG/DL
HDLC SERPL-MCNC: 84 MG/DL
HGB BLD-MCNC: 13.5 G/DL (ref 11.5–15.4)
IMM GRANULOCYTES # BLD AUTO: 0.01 THOUSAND/UL (ref 0–0.2)
IMM GRANULOCYTES NFR BLD AUTO: 0 % (ref 0–2)
LDLC SERPL CALC-MCNC: 142 MG/DL (ref 0–100)
LDLC SERPL CALC-MCNC: 144 MG/DL (ref 0–100)
LYMPHOCYTES # BLD AUTO: 2.56 THOUSANDS/ÂΜL (ref 0.6–4.47)
LYMPHOCYTES NFR BLD AUTO: 56 % (ref 14–44)
MCH RBC QN AUTO: 31.4 PG (ref 26.8–34.3)
MCHC RBC AUTO-ENTMCNC: 31.9 G/DL (ref 31.4–37.4)
MCV RBC AUTO: 98 FL (ref 82–98)
MONOCYTES # BLD AUTO: 0.38 THOUSAND/ÂΜL (ref 0.17–1.22)
MONOCYTES NFR BLD AUTO: 8 % (ref 4–12)
NEUTROPHILS # BLD AUTO: 1.42 THOUSANDS/ÂΜL (ref 1.85–7.62)
NEUTS SEG NFR BLD AUTO: 31 % (ref 43–75)
NONHDLC SERPL-MCNC: 155 MG/DL
NONHDLC SERPL-MCNC: 158 MG/DL
NRBC BLD AUTO-RTO: 0 /100 WBCS
PLATELET # BLD AUTO: 381 THOUSANDS/UL (ref 149–390)
PMV BLD AUTO: 10.8 FL (ref 8.9–12.7)
POTASSIUM SERPL-SCNC: 4.1 MMOL/L (ref 3.5–5.3)
PROT SERPL-MCNC: 6.5 G/DL (ref 6.4–8.4)
RBC # BLD AUTO: 4.3 MILLION/UL (ref 3.81–5.12)
SODIUM SERPL-SCNC: 143 MMOL/L (ref 135–147)
TRIGL SERPL-MCNC: 64 MG/DL (ref ?–150)
TRIGL SERPL-MCNC: 69 MG/DL (ref ?–150)
TSH SERPL DL<=0.05 MIU/L-ACNC: 1.05 UIU/ML (ref 0.45–4.5)
VIT B12 SERPL-MCNC: 318 PG/ML (ref 180–914)
WBC # BLD AUTO: 4.61 THOUSAND/UL (ref 4.31–10.16)

## 2025-05-15 PROCEDURE — 83036 HEMOGLOBIN GLYCOSYLATED A1C: CPT

## 2025-05-15 PROCEDURE — 80053 COMPREHEN METABOLIC PANEL: CPT

## 2025-05-15 PROCEDURE — 84443 ASSAY THYROID STIM HORMONE: CPT

## 2025-05-15 PROCEDURE — 82306 VITAMIN D 25 HYDROXY: CPT

## 2025-05-15 PROCEDURE — 80061 LIPID PANEL: CPT

## 2025-05-15 PROCEDURE — 82607 VITAMIN B-12: CPT

## 2025-05-15 PROCEDURE — 36415 COLL VENOUS BLD VENIPUNCTURE: CPT

## 2025-05-15 PROCEDURE — 85025 COMPLETE CBC W/AUTO DIFF WBC: CPT

## 2025-05-19 ENCOUNTER — RESULTS FOLLOW-UP (OUTPATIENT)
Dept: INTERNAL MEDICINE CLINIC | Facility: CLINIC | Age: 55
End: 2025-05-19

## 2025-05-29 ENCOUNTER — HOSPITAL ENCOUNTER (OUTPATIENT)
Dept: RADIOLOGY | Facility: HOSPITAL | Age: 55
Discharge: HOME | End: 2025-05-29
Payer: COMMERCIAL

## 2025-05-29 VITALS — BODY MASS INDEX: 21.33 KG/M2 | HEIGHT: 65 IN | WEIGHT: 128 LBS

## 2025-05-29 DIAGNOSIS — N60.02 BILATERAL BREAST CYSTS: ICD-10-CM

## 2025-05-29 DIAGNOSIS — N60.01 BILATERAL BREAST CYSTS: ICD-10-CM

## 2025-05-29 DIAGNOSIS — Z12.31 ENCOUNTER FOR SCREENING MAMMOGRAM FOR BREAST CANCER: ICD-10-CM

## 2025-05-29 PROCEDURE — 77063 BREAST TOMOSYNTHESIS BI: CPT

## 2025-05-29 PROCEDURE — 77067 SCR MAMMO BI INCL CAD: CPT

## 2025-06-11 ENCOUNTER — HOSPITAL ENCOUNTER (OUTPATIENT)
Dept: RADIOLOGY | Facility: HOSPITAL | Age: 55
Discharge: HOME | End: 2025-06-11
Attending: INTERNAL MEDICINE
Payer: COMMERCIAL

## 2025-06-11 DIAGNOSIS — M81.0 AGE-RELATED OSTEOPOROSIS WITHOUT CURRENT PATHOLOGICAL FRACTURE: ICD-10-CM

## 2025-06-11 PROCEDURE — 77080 DXA BONE DENSITY AXIAL: CPT

## 2025-06-12 ENCOUNTER — EVALUATION (OUTPATIENT)
Dept: PHYSICAL THERAPY | Facility: CLINIC | Age: 55
End: 2025-06-12
Attending: PHYSICAL MEDICINE & REHABILITATION
Payer: COMMERCIAL

## 2025-06-12 ENCOUNTER — TELEPHONE (OUTPATIENT)
Dept: FAMILY MEDICINE CLINIC | Facility: CLINIC | Age: 55
End: 2025-06-12

## 2025-06-12 ENCOUNTER — OFFICE VISIT (OUTPATIENT)
Dept: OBGYN CLINIC | Facility: OTHER | Age: 55
End: 2025-06-12
Payer: COMMERCIAL

## 2025-06-12 VITALS — BODY MASS INDEX: 22.16 KG/M2 | WEIGHT: 133 LBS | HEIGHT: 65 IN

## 2025-06-12 DIAGNOSIS — M25.521 RIGHT ELBOW PAIN: ICD-10-CM

## 2025-06-12 DIAGNOSIS — S46.811A FULL THICKNESS TEAR OF RIGHT SUBSCAPULARIS TENDON, INITIAL ENCOUNTER: Primary | ICD-10-CM

## 2025-06-12 DIAGNOSIS — G89.29 CHRONIC RIGHT SHOULDER PAIN: Primary | ICD-10-CM

## 2025-06-12 DIAGNOSIS — M25.511 CHRONIC RIGHT SHOULDER PAIN: Primary | ICD-10-CM

## 2025-06-12 PROCEDURE — 99204 OFFICE O/P NEW MOD 45 MIN: CPT | Performed by: ORTHOPAEDIC SURGERY

## 2025-06-12 PROCEDURE — 97162 PT EVAL MOD COMPLEX 30 MIN: CPT | Performed by: PHYSICAL THERAPIST

## 2025-06-12 PROCEDURE — 97112 NEUROMUSCULAR REEDUCATION: CPT | Performed by: PHYSICAL THERAPIST

## 2025-06-12 RX ORDER — SODIUM CHLORIDE, SODIUM LACTATE, POTASSIUM CHLORIDE, CALCIUM CHLORIDE 600; 310; 30; 20 MG/100ML; MG/100ML; MG/100ML; MG/100ML
20 INJECTION, SOLUTION INTRAVENOUS CONTINUOUS
OUTPATIENT
Start: 2025-06-12

## 2025-06-12 NOTE — ASSESSMENT & PLAN NOTE
The patient does have a small full-thickness tear of her subscapularis and at least on examination appears to have long head biceps tendon instability, this is not seen on the MRI but as a dynamic problem we do not always capture this on an MRI.  Regardless given her persistence of symptoms and the severity of the symptoms, the impact upon her activities of daily living and the relatively acute onset of nature she is indicated for arthroscopic repair of the rotator cuff and likely long head biceps tenodesis.  She has started physical therapy so she will continue to do so as she leads up to the procedure and if her symptoms improve she can consider avoiding the surgery but I do think she will end up having this fixed to improve her overall outcome.    A thorough discussion was performed with the patient regarding the risks and benefit of operative and nonoperative treatment of their rotator cuff tear.  Risks discussed include but were not limited to infection, neurovascular injury, recurrent tear, nonhealing of the repair, need for further surgery, need for biceps tenodesis or tenotomy, stiffness, need for prolonged rehabilitation, as well as the risk of anesthesia.  After this discussion all questions were answered and informed consent was obtained in the office for arthroscopic rotator cuff repair of the right shoulder.  The patient will be scheduled for this procedure accordingly.     Patient's daughter is getting  in October and the [employer] party is in July.  Patient plans on have surgery when she returns from the [employer] party so she is out of the sling for the wedding.  Discussed she can continue PT in the mean-time.    Orders:    Arc 2.0    Ambulatory Referral to Physical Therapy; Future

## 2025-06-12 NOTE — PATIENT INSTRUCTIONS
You are being scheduled for a shoulder arthroscopy to treat your symptoms.  Below are some instructions and information on what to expect before and after your surgery.        Pre-Surgical Preparation for Arthroscopic Shoulder Surgery:     You will be contacted the evening prior to your surgery to confirm the scheduled time of the procedure and when to arrive at the hospital.   Do not eat or drink anything after midnight the night before your surgery.  Since you are having out-patient surgery, make sure that you have someone who can drive you home later in the day.  Also, prepare that person for a long day, as the process of safely preparing for and recovering from the procedure is more time consuming than the actual procedure!      As you will be in a sling after surgery, please wear or bring a loose fitting button-down shirt so that you can easily place this over the sling when you leave the surgical suite.  This avoids having to place the operative arm in a sleeve.  Most patients find that this is the easiest outfit to wear for the first week or so after surgery so you may want to plan accordingly.    Most patients find that lying down in bed after shoulder surgery accentuates their discomfort.  This is likely related to the effect of gravity on the swelling in the shoulder.  As a result, most patients sleep better in a recliner or in bed with pillows propped up behind their back for the first few days or weeks after surgery.  It is a good idea to plan for this ahead of time so there will be less hassle getting things set up the night after surgery.       What to Expect After Arthroscopic Shoulder Surgery:    It is normal to have swelling and discomfort in the shoulder for several days or a week after surgery.  It is also normal to have a small amount of drainage from the surgical wounds (especially the first few days after surgery), as we put fluid into the shoulder to visualize the structures during surgery.   "It is NOT normal to have foul smelling, purulent drainage and if this is noted, please contact the office immediately or proceed to the emergency room for evaluation as this may indicate an infection.     Applying ice bags to the shoulder may help with pain that is not controlled by the regional block.  Ice should be applied 20-30 minutes at a time, every hour or two.  Make sure to put a thin towel or T-shirt next to your skin to avoid direct contact of the ice with the skin. Icing is most helpful in the first 48 hours, although many people find that continuing past this time frame lessens their postoperative pain.  Please note that your post-operative dressing is not conductive to ice, so if you need to, it is okay to remove that dressing even the night after surgery and place band-aids over the wounds in order for the ice to take effect.     Pain Control    Most patients will receive a nerve block, the local anesthetic may keep your whole arm numb for up to 4 days.  You will be given a prescription for narcotic pain medication when you are discharged from the hospital.  With the newer nerve block that is being utilized, patients are rarely requiring the use of this narcotic pain medication.  If you find you do not tolerate that type of pain medicine well, call our office and we will try another one.     In addition to the narcotic pain medication, it is safe to use an anti-inflammatory (unless the patient has a medical condition that would not allow safe use of this mediation).  This includes the Advil, Motrin, Ibuprofen and Alleve category of medications.  Simply follow the over the counter dosing on the package and use as indicated as another adjunct.  Importantly since these medications are all very similar, use only one of them.  Tylenol is a separate medication that can be utilized as well and can be taken at the same time as the other medication or given in a \"staggered\" manner.  Just make sure that you " follow the dosing on the over the counter bottle instructions.  Also make sure that the pain medication prescribed by Dr Heredia's team does not contain acetaminophen (this is found in Percocet and Vicodin).   Typically we do not prescribe those types of pain medications but if for some reason that has been prescribed DO NOT add more Tylenol (acetaminophen) as you could end up taking too much of that medication.    As mentioned above, most patients find that lying down accentuates their discomfort. You might sleep better in a recliner, or propped up in bed.     Dr. Heredia encourages patients to safely ambulate around the house as much as possible in the first few days after the procedure as this can help with blood circulation in the legs. While the incidence of blood clots is very rare following shoulder surgery, early ambulation is a great way to help decrease the already low rate.    24 hours after the surgery you may remove the bandage and cover incisions with Band-Aids if needed. At that time you may shower, the wounds will have a surgical glue that will protect them from shower water but do not submerge your incisions directly (bathing or swimming) until at least 2 weeks post-operatively.  It is safe to let the arm hang at the side and take a shower and put on a shirt without the sling on.  Just make sure that you do not use the operative are to reach out and grab anything as that may damage the repair.  When you are done showering and getting dressed please return the operative arm to the sling.    Unless noted otherwise in your discharge paperwork, Dr. Heredia uses absorbable sutures so they do not need to be removed.    Dr. Heredia’s physician assistant (PA) will see you in the office a few days after the procedure to review the intra-operative findings and to initiate physical therapy if appropriate.  A post-operative appointment should have been scheduled for you already, but if for some reason this did  not happen, please call the office to make one.     Physical therapy is important after nearly all shoulder surgeries and a detailed rehabilitation plan based on the specific intra-operative findings and procedures will be provided to your therapist at the first post-operative office visit.  Most patients have post-operative therapy appointments scheduled pre-operatively, but if you do not, that will be handled at the first post-operative office visit.  Unless expressly directed otherwise it is safe to remove the sling even the first day after the surgery and let the arm hang by the side.  This allows patients to shower and even put a shirt on (bad arm in the sleeve first).  It is also safe to flex and extend their wrist, hand and fingers as much as possible when the block wears off.  These simple motions can serve to pump fluid out of the forearm and decrease swelling in the arm.

## 2025-06-12 NOTE — PROGRESS NOTES
Assessment & Plan  Full thickness tear of right subscapularis tendon, initial encounter    The patient does have a small full-thickness tear of her subscapularis and at least on examination appears to have long head biceps tendon instability, this is not seen on the MRI but as a dynamic problem we do not always capture this on an MRI.  Regardless given her persistence of symptoms and the severity of the symptoms, the impact upon her activities of daily living and the relatively acute onset of nature she is indicated for arthroscopic repair of the rotator cuff and likely long head biceps tenodesis.  She has started physical therapy so she will continue to do so as she leads up to the procedure and if her symptoms improve she can consider avoiding the surgery but I do think she will end up having this fixed to improve her overall outcome.    A thorough discussion was performed with the patient regarding the risks and benefit of operative and nonoperative treatment of their rotator cuff tear.  Risks discussed include but were not limited to infection, neurovascular injury, recurrent tear, nonhealing of the repair, need for further surgery, need for biceps tenodesis or tenotomy, stiffness, need for prolonged rehabilitation, as well as the risk of anesthesia.  After this discussion all questions were answered and informed consent was obtained in the office for arthroscopic rotator cuff repair of the right shoulder.  The patient will be scheduled for this procedure accordingly.     Patient's daughter is getting  in October and the [employer] party is in July.  Patient plans on have surgery when she returns from the [employer] party so she is out of the sling for the wedding.  Discussed she can continue PT in the mean-time.    Orders:    Arc 2.0    Ambulatory Referral to Physical Therapy; Future      Subjective:   Patient ID: Yesenia Newberry is a 54 y.o. female      HPI  The patient presents with a chief  "complaint of right shoulder pain.   The pain began 3 month(s) ago and is associated with an acute injury.  Patient reports on 3/17/25 she was hold back her daughter's rotweiler who was after her cat.  She did have an injury a year ago moving a patient however symptoms had resolved until this recent injury. She was seen and evaluated by Dr Oates who ordered a MRI and later provided with patient with a CS injection and referral to PT.  Patient reports some improvement with the injection and she has just started therapy.  The patient describes the pain as aching, dull, and sharp in intensity,  intermittent in timing, and localizes the pain to the  right globally.  The pain is worse with movement and raising arm over head and relieved by rest.  The pain is not associated with numbness and tingling.  The pain is not associated with constitutional symptoms. The patient is awoken at night by the pain.        The following portions of the patient's history were reviewed and updated as appropriate: allergies, current medications, past family history, past medical history, past social history, past surgical history and problem list.      Objective:  Ht 5' 5\" (1.651 m) Comment: verbal  Wt 60.3 kg (133 lb)   BMI 22.13 kg/m²       Right Shoulder Exam     Range of Motion   External rotation:  80   Forward flexion:  120 (PROM 170)   Right shoulder internal rotation 0 degrees: buttocks.     Muscle Strength   External rotation: 5/5   Supraspinatus: 4/5   Subscapularis: 3/5     Tests   Kathleen test: positive  Impingement: positive    Other   Erythema: absent  Sensation: normal  Pulse: present            Physical Exam  Vitals and nursing note reviewed.   Constitutional:       Appearance: She is well-developed.   HENT:      Head: Normocephalic and atraumatic.     Eyes:      Pupils: Pupils are equal, round, and reactive to light.       Cardiovascular:      Rate and Rhythm: Normal rate and regular rhythm.      Pulses: Normal pulses. "      Heart sounds: Normal heart sounds.   Pulmonary:      Effort: Pulmonary effort is normal. No respiratory distress.      Breath sounds: Normal breath sounds.   Abdominal:      General: Abdomen is flat. There is no distension.      Palpations: Abdomen is soft.     Musculoskeletal:      Cervical back: Normal range of motion and neck supple.     Skin:     General: Skin is warm and dry.     Neurological:      Mental Status: She is alert and oriented to person, place, and time.     Psychiatric:         Mood and Affect: Mood normal.         Behavior: Behavior normal.         Thought Content: Thought content normal.         Judgment: Judgment normal.           I have personally reviewed pertinent films in PACS and my interpretation is as follows.    Right shoulder MRI demonstrates full thickness tear inferior subscapularis, supraspinatus and infraspinatus tendonitis, no biceps instability seen       Records Reviewed: Dr. Victoria's office notes    Scribe Attestation      I,:  Elba Acevedo MA am acting as a scribe while in the presence of the attending physician.:       I,:  Ash Heredia MD personally performed the services described in this documentation    as scribed in my presence.:

## 2025-06-12 NOTE — PROGRESS NOTES
PT Evaluation     Today's date: 2025  Patient name: Yesenia Newberry  : 1970  MRN: [id number]  Referring provider: Sandie Lopes DO  Dx:   Encounter Diagnosis     ICD-10-CM    1. Chronic right shoulder pain  M25.511     G89.29       2. Right elbow pain  M25.521           Start Time: 0915  Stop Time: 1000  Total time in clinic (min): 45 minutes    Assessment  Impairments: abnormal muscle firing, abnormal muscle tone, abnormal or restricted ROM, abnormal movement, impaired physical strength, lacks appropriate home exercise program, pain with function and poor posture     Assessment details: Yesenia Newberry is a pleasant 54 y.o. female who presents with R shoulder and elbow pain following an injury holding her dog back from her cat with arm stretched out to her side. MRI confirmed subscapularis tear. R elbow pain unable to be recreated in evaluation, but numbness possibly from radial tunnel syndrome. The patient's greatest concerns are worry over not knowing what's wrong, concern at no signs of improvement, and fear of not being able to keep active.      No further referral appears necessary at this time based upon examination results.    Primary movement impairment diagnosis of R shoulder AROM resulting in pathoanatomical symptoms of R shoulder and elbow pain and limiting her ability to carry, exercise or recreation, lift, perform household chores, perform yard work, reach overhead, sleep, and wash behind the back.    Impairments include:  1) R shoulder ROM  2) R shoulder strength  3) Posture  4) R elbow/forearm nerve mobility    Etiologic factors include injury holding her dog back from her cat.    Discussed risks, benefits, and alternatives to treatment, and answered all patient questions to patient satisfaction.  Understanding of Dx/Px/POC: good     Prognosis: good    Goals  Impairment Goals 4-6 weeks  - Decrease pain to <3/10  - Improve shoulder AROM to equal to the unaffected upper extremity  -  Increase shoulder strength to 4/5 throughout  - Increase scapular strength to 4/5 throughout    Functional Goals 6-8 weeks  - Return to Prior Level of Function  - Patient will be independent with HEP  - Patient will be able to reach overhead without increased pain/compensation/difficulty  - Patient will be able to reach behind back without increased pain/compensation/difficulty   - Patient will be able to exercise without increased pain/compensation/difficulty   - Patient will be able to lift with proper mechanics     Plan  Patient would benefit from: skilled physical therapy  Planned modality interventions: cryotherapy, TENS, thermotherapy: hydrocollator packs and unattended electrical stimulation    Planned therapy interventions: abdominal trunk stabilization, behavior modification, body mechanics training, breathing training, flexibility, functional ROM exercises, home exercise program, joint mobilization, manual therapy, massage, Rueda taping, muscle pump exercises, neuromuscular re-education, patient education, postural training, strengthening, stretching, therapeutic activities, therapeutic exercise and therapeutic training    Frequency: 2x week  Duration in weeks: 8  Treatment plan discussed with: patient  Plan details: Prognosis above is given PT services 2x/week tapering to 1x/week over the next 2 months and home program adherence.        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  HOBBIES/EXERCISE: very active, not specific exercises, but does heavy lifting, building, etc.  PLOF:  hx of carpal tunnel, had a surgery for this and this resolved.  HISTORY OF CURRENT INJURY:  was a labor and delivery nurse 2 years ago. She hurt her shoulder 2+ years ago pulling a heavy patient. She switched jobs lately, and went to PT which helped. She had a new injury when she tried to hold back her  "dog from attacking her cat in March 2025. She did not get treatment at that time but it has gotten progressively worse. She got an MRI that showed a torn RTC. She had pain in her R elbow at the same time as her shoulder pain. She was seeing Dr. Lopes at first, who suggested the elbow pain was a compensation. She got an injection in the R shoulder that relieved the pain enough that she was happy. She saw him for a follow-up later, and the elbow pain had not improved. She was having \"zingers\" and has numbness in her forearm on the R side as well. Her pain has now gotten worse again since May, even worse than her shoulder was feeling before the injection. Elbow does not seem movement related, deep pain.  PAIN LOCATION/DESCRIPTORS: R lateral shoulder, entire R shoulder, also R elbow (deep), numbness in R forearm. All come at the same time  AGGRAVATING FACTORS: reaching overhead, throwing a ball, reaching forwards, lifting away from her body, vacuuming, mopping, washing windows, prolonged writing while charting, sleeping on R shoulder  EASES: voltaren, advil  DAY PATTERN: shoulder worse at the end of the day, elbow all day  IMAGING:    IMPRESSION:  1.  Subscapularis, supraspinatus and infraspinatus insertional tendinosis with a tear of the inferior bundle of the subscapularis tendon exhibiting minimal tendon retraction without secondary signs of chronicity.  2.  Intact long head biceps tendon.  3.  Glenohumeral degenerative change with degenerative fraying at the superior glenoid labrum with remainder of the labrum appearing intact.    SPECIAL QUESTIONS:    Yesenia denies a new onset of Constant night pain, History of cancer, and Tingling.  PATIENT GOALS: Patient wishes to be able to sleep better at night.  Patient wishes to be able to function normally with R arm.  Patient Goals  Patient goals for therapy: decreased pain, increased motion, increased strength, independence with ADLs/IADLs and return to sport/leisure " activities    Pain  Current pain ratin  At best pain ratin  At worst pain rating: 10  Location: R shoudler and elbow  Quality: sharp, discomfort, dull ache and squeezing  Progression: worsening          Objective     Postural Observations  Seated posture: poor  Standing posture: fair    Additional Postural Observation Details  Rounded shoulders    Active Range of Motion   Cervical/Thoracic Spine       Cervical    Flexion:  WFL  Extension:  WFL  Left lateral flexion:  WFL  Right lateral flexion:  WFL  Left rotation:  WFL  Right rotation:  WFL  Left Shoulder   Flexion: 155 degrees   Abduction: 160 degrees   External rotation 0°: 80 degrees   External rotation BTH: T3   Internal rotation BTB: T7     Right Shoulder   Flexion: 85 degrees with pain  Abduction: 70 degrees with pain  External rotation 0°: 65 degrees with pain  External rotation BTH: C4 with pain  Internal rotation BTB: L5 with pain    Passive Range of Motion     Right Shoulder   Flexion: 110 degrees   Abduction: 80 degrees   External rotation 45°: WFL  Internal rotation 45°: with pain    Strength/Myotome Testing     Left Shoulder   Normal muscle strength    Additional Strength Details  DNT R arm due to high symptom irritability    Tests   Cervical   Negative vertical compression and cervical distraction.     Left   Negative Spurling's Test A.     Right   Negative Spurling's Test A.     Left Shoulder   Negative ULTT1, ULTT2 and ULTT3.     Right Shoulder   Positive Hawkin's, Neer's and Michelle's.   Negative Speed's, ULTT1, ULTT2, ULTT3 and Yergason's.     Lumbar   Negative vertical compression.     Additional Tests Details  Elbow and wrist ROM WNL and painfree  Numbness in R anterior forearm does not change with testing  (-) lat epicondylitis testing, stretch in involved area with wrist flx and elbow extension              POC Expires Auth Status Start Date Expiration Date PT Visit Limit     No auth      Date        Used        Remaining            Diagnosis: R shoulder and elbow   Precautions:    Primary Goals: 1) R shoulder ROM  2) R shoulder strength  3) Posture  4) R elbow/forearm nerve mobility   *asterisks by exercise = given for HEP   Manuals 6/12       PROM     DO FOTO   Post shoulder mobs        IASTM R forearm                        There Ex        Pulleys        Table slides        Wall slides        Cane flx         Cane ER        Sleeper S        Post shoulder S        Thoracic ext in chair        Radial nerve glide        Wrist flx S                        Neuro Re-Ed        Scap retractions        Chin tucks        SL series        RTC isometrics        TB ER/IR                                                        Patient education Diagnosis, prognosis, activity modification        Re-evaluation              Ther Act                                         Modalities

## 2025-06-12 NOTE — TELEPHONE ENCOUNTER
Patient returned called, request to reschedule New Patient freddy Vaca. Patient states she was referred by another provider, patient states she is newly diagnosed Diabetic. States lab orders shoe elevation in cholesterol levels, and patient states cardiology I scheduling out until Sept. Confirmed provider availability.Patient request a callback with suggestions, Please advise.

## 2025-06-18 ENCOUNTER — APPOINTMENT (OUTPATIENT)
Dept: PHYSICAL THERAPY | Facility: CLINIC | Age: 55
End: 2025-06-18
Attending: PHYSICAL MEDICINE & REHABILITATION
Payer: COMMERCIAL

## 2025-06-19 ENCOUNTER — APPOINTMENT (OUTPATIENT)
Dept: PHYSICAL THERAPY | Facility: CLINIC | Age: 55
End: 2025-06-19
Attending: PHYSICAL MEDICINE & REHABILITATION
Payer: COMMERCIAL

## 2025-06-23 ENCOUNTER — APPOINTMENT (OUTPATIENT)
Dept: PHYSICAL THERAPY | Facility: CLINIC | Age: 55
End: 2025-06-23
Attending: PHYSICAL MEDICINE & REHABILITATION
Payer: COMMERCIAL

## 2025-06-26 ENCOUNTER — APPOINTMENT (OUTPATIENT)
Dept: PHYSICAL THERAPY | Facility: CLINIC | Age: 55
End: 2025-06-26
Attending: PHYSICAL MEDICINE & REHABILITATION
Payer: COMMERCIAL

## 2025-07-02 ENCOUNTER — APPOINTMENT (OUTPATIENT)
Dept: PHYSICAL THERAPY | Facility: CLINIC | Age: 55
End: 2025-07-02
Attending: PHYSICAL MEDICINE & REHABILITATION
Payer: COMMERCIAL

## 2025-07-03 ENCOUNTER — APPOINTMENT (OUTPATIENT)
Dept: PHYSICAL THERAPY | Facility: CLINIC | Age: 55
End: 2025-07-03
Attending: PHYSICAL MEDICINE & REHABILITATION
Payer: COMMERCIAL

## 2025-07-07 ENCOUNTER — APPOINTMENT (OUTPATIENT)
Dept: PHYSICAL THERAPY | Facility: CLINIC | Age: 55
End: 2025-07-07
Attending: PHYSICAL MEDICINE & REHABILITATION
Payer: COMMERCIAL

## 2025-07-09 ENCOUNTER — APPOINTMENT (OUTPATIENT)
Dept: PHYSICAL THERAPY | Facility: CLINIC | Age: 55
End: 2025-07-09
Attending: PHYSICAL MEDICINE & REHABILITATION
Payer: COMMERCIAL

## 2025-07-11 ENCOUNTER — ANESTHESIA EVENT (OUTPATIENT)
Dept: PERIOP | Facility: AMBULARY SURGERY CENTER | Age: 55
End: 2025-07-11
Payer: COMMERCIAL

## 2025-07-15 ENCOUNTER — OFFICE VISIT (OUTPATIENT)
Dept: FAMILY MEDICINE CLINIC | Facility: CLINIC | Age: 55
End: 2025-07-15
Payer: COMMERCIAL

## 2025-07-15 VITALS
OXYGEN SATURATION: 99 % | SYSTOLIC BLOOD PRESSURE: 128 MMHG | DIASTOLIC BLOOD PRESSURE: 94 MMHG | HEIGHT: 66 IN | BODY MASS INDEX: 20.86 KG/M2 | HEART RATE: 75 BPM | TEMPERATURE: 98.2 F | WEIGHT: 129.8 LBS

## 2025-07-15 DIAGNOSIS — E11.9 TYPE 2 DIABETES MELLITUS WITHOUT COMPLICATION, WITHOUT LONG-TERM CURRENT USE OF INSULIN (HCC): ICD-10-CM

## 2025-07-15 DIAGNOSIS — Z23 ENCOUNTER FOR IMMUNIZATION: ICD-10-CM

## 2025-07-15 DIAGNOSIS — E03.9 HYPOTHYROIDISM, UNSPECIFIED TYPE: ICD-10-CM

## 2025-07-15 DIAGNOSIS — R00.0 TACHYCARDIA: ICD-10-CM

## 2025-07-15 DIAGNOSIS — R07.9 CHEST PAIN, UNSPECIFIED TYPE: Primary | ICD-10-CM

## 2025-07-15 DIAGNOSIS — R00.2 PALPITATIONS: ICD-10-CM

## 2025-07-15 DIAGNOSIS — E78.2 MIXED HYPERLIPIDEMIA: ICD-10-CM

## 2025-07-15 PROCEDURE — 99214 OFFICE O/P EST MOD 30 MIN: CPT | Performed by: NURSE PRACTITIONER

## 2025-07-15 RX ORDER — METFORMIN HYDROCHLORIDE 500 MG/1
500 TABLET, EXTENDED RELEASE ORAL
Qty: 90 TABLET | Refills: 0 | Status: SHIPPED | OUTPATIENT
Start: 2025-07-15 | End: 2025-07-25

## 2025-07-16 ENCOUNTER — APPOINTMENT (OUTPATIENT)
Dept: PHYSICAL THERAPY | Facility: CLINIC | Age: 55
End: 2025-07-16
Attending: PHYSICAL MEDICINE & REHABILITATION
Payer: COMMERCIAL

## 2025-07-16 PROCEDURE — 93000 ELECTROCARDIOGRAM COMPLETE: CPT | Performed by: NURSE PRACTITIONER

## 2025-07-18 ENCOUNTER — APPOINTMENT (OUTPATIENT)
Dept: PHYSICAL THERAPY | Facility: CLINIC | Age: 55
End: 2025-07-18
Attending: PHYSICAL MEDICINE & REHABILITATION
Payer: COMMERCIAL

## 2025-07-18 ENCOUNTER — HOSPITAL ENCOUNTER (OUTPATIENT)
Dept: ULTRASOUND IMAGING | Facility: HOSPITAL | Age: 55
Discharge: HOME | End: 2025-07-18
Payer: COMMERCIAL

## 2025-07-18 DIAGNOSIS — E03.9 HYPOTHYROIDISM, UNSPECIFIED TYPE: ICD-10-CM

## 2025-07-18 PROCEDURE — 76536 US EXAM OF HEAD AND NECK: CPT

## 2025-07-21 NOTE — PRE-PROCEDURE INSTRUCTIONS
Pre-Surgery Instructions:   Medication Instructions    alendronate (FOSAMAX) 70 mg tablet Takes on Sundays, due after surgery    levothyroxine 125 mcg tablet Take day of surgery.    rizatriptan (MAXALT) 5 mg tablet Uses PRN- OK to take day of surgery   Medication instructions for day of surgery reviewed. Patient verbalized understanding and agrees with the plan.  Please take all instructed medications with only a sip of water. Please do not take any over the counter (non-prescribed) vitamins or supplements for one week prior to date of surgery.      You will receive a call one business day prior to surgery with an arrival time and hospital directions. If your surgery is scheduled on a Monday, the hospital will be calling you on the Friday prior to your surgery. If you have not heard from anyone by 8pm, please call the hospital supervisor through the hospital  at [phone]. ([place] [phone] or [place] [phone]).    Do not eat or drink anything after midnight the night before your surgery, including candy, mints, lifesavers, or chewing gum. Do not drink alcohol 24hrs before your surgery. Try not to smoke at least 24hrs before your surgery.       Follow the pre surgery showering instructions as listed in the “My Surgical Experience Booklet” or otherwise provided by your surgeon's office. Do not use a blade to shave the surgical area 1 week before surgery. It is okay to use a clean electric clippers up to 24 hours before surgery. Do not apply any lotions, creams, including makeup, cologne, deodorant, or perfumes after showering on the day of your surgery. Do not use dry shampoo, hair spray, hair gel, or any type of hair products.     No contact lenses, eye make-up, or artificial eyelashes. Remove nail polish, including gel polish, and any artificial, gel, or acrylic nails if possible. Remove all jewelry including rings and body piercing jewelry.     Wear causal clothing that is easy to take on and  off. Consider your type of surgery.    Keep any valuables, jewelry, piercings at home. Please bring any specially ordered equipment (sling, braces) if indicated.    Arrange for a responsible person to drive you to and from the hospital on the day of your surgery. Please confirm the visitor policy for the day of your procedure when you receive your phone call with an arrival time.     Call the surgeon's office with any new illnesses, exposures, or additional questions prior to surgery.    Please reference your “My Surgical Experience Booklet” for additional information to prepare for your upcoming surgery.     No NSAIDs from now until surgery. Tylenol ok if needed.

## 2025-07-22 ENCOUNTER — OFFICE VISIT (OUTPATIENT)
Age: 55
End: 2025-07-22
Payer: COMMERCIAL

## 2025-07-22 ENCOUNTER — HOSPITAL ENCOUNTER (OUTPATIENT)
Dept: NON INVASIVE DIAGNOSTICS | Facility: HOSPITAL | Age: 55
Discharge: HOME | End: 2025-07-22
Payer: COMMERCIAL

## 2025-07-22 VITALS
BODY MASS INDEX: 21.49 KG/M2 | SYSTOLIC BLOOD PRESSURE: 128 MMHG | HEART RATE: 68 BPM | DIASTOLIC BLOOD PRESSURE: 84 MMHG | WEIGHT: 129 LBS | HEIGHT: 65 IN

## 2025-07-22 VITALS — WEIGHT: 126 LBS | TEMPERATURE: 97.5 F | BODY MASS INDEX: 20.97 KG/M2

## 2025-07-22 DIAGNOSIS — R07.9 CHEST PAIN, UNSPECIFIED TYPE: ICD-10-CM

## 2025-07-22 DIAGNOSIS — L82.1 SEBORRHEIC KERATOSES: Primary | ICD-10-CM

## 2025-07-22 DIAGNOSIS — R00.0 TACHYCARDIA: ICD-10-CM

## 2025-07-22 DIAGNOSIS — L72.0 MILIA: ICD-10-CM

## 2025-07-22 DIAGNOSIS — R00.2 PALPITATIONS: ICD-10-CM

## 2025-07-22 LAB
AORTIC ROOT: 3.2 CM
BSA FOR ECHO PROCEDURE: 1.64 M2
E WAVE DECELERATION TIME: 206 MS
E/A RATIO: 0.92
FRACTIONAL SHORTENING: 35 (ref 28–44)
INTERVENTRICULAR SEPTUM IN DIASTOLE (PARASTERNAL SHORT AXIS VIEW): 0.8 CM
INTERVENTRICULAR SEPTUM: 0.8 CM (ref 0.6–1.1)
LAAS-AP2: 12.3 CM2
LAAS-AP4: 11 CM2
LEFT ATRIUM SIZE: 3.1 CM
LEFT ATRIUM VOLUME (MOD BIPLANE): 25 ML
LEFT ATRIUM VOLUME INDEX (MOD BIPLANE): 15.1 ML/M2
LEFT INTERNAL DIMENSION IN SYSTOLE: 3 CM (ref 2.1–4)
LEFT VENTRICULAR INTERNAL DIMENSION IN DIASTOLE: 4.6 CM (ref 3.5–6)
LEFT VENTRICULAR POSTERIOR WALL IN END DIASTOLE: 0.7 CM
LEFT VENTRICULAR STROKE VOLUME: 59 ML
LVSV (TEICH): 59 ML
MV E'TISSUE VEL-LAT: 9 CM/S
MV E'TISSUE VEL-SEP: 9 CM/S
MV PEAK A VEL: 0.78 M/S
MV PEAK E VEL: 72 CM/S
MV STENOSIS PRESSURE HALF TIME: 60 MS
MV VALVE AREA P 1/2 METHOD: 3.67
RIGHT VENTRICLE ID DIMENSION: 3.2 CM
SL CV LEFT ATRIUM LENGTH A2C: 4.4 CM
SL CV LV EF: 62
SL CV PED ECHO LEFT VENTRICLE DIASTOLIC VOLUME (MOD BIPLANE) 2D: 95 ML
SL CV PED ECHO LEFT VENTRICLE SYSTOLIC VOLUME (MOD BIPLANE) 2D: 36 ML
TR MAX PG: 20 MMHG
TR PEAK VELOCITY: 2.2 M/S
TRICUSPID ANNULAR PLANE SYSTOLIC EXCURSION: 1.8 CM
TRICUSPID VALVE PEAK REGURGITATION VELOCITY: 2.21 M/S

## 2025-07-22 PROCEDURE — 93306 TTE W/DOPPLER COMPLETE: CPT | Performed by: INTERNAL MEDICINE

## 2025-07-22 PROCEDURE — 93306 TTE W/DOPPLER COMPLETE: CPT

## 2025-07-22 PROCEDURE — 99203 OFFICE O/P NEW LOW 30 MIN: CPT | Performed by: REGISTERED NURSE

## 2025-07-22 NOTE — PROGRESS NOTES
"[facility] Note     Patient Name: Yesenia Newberry  Encounter Date: 07/22/2025       Have you been cared for by a [facility] Dermatologist in the last 3 years and, if so, which description applies to you? NO. I am considered a \"new\" patient and must complete all patient intake questions. I am of child-bearing potential.     REVIEW OF SYSTEMS:  Have you recently had or currently have any of the following? Recent fever or chills? No  Any non-healing wound? No  Are you pregnant or planning to become pregnant? No  Are you currently or planning to be nursing or breast feeding? No   PAST MEDICAL HISTORY:  Have you personally ever had or currently have any of the following?  If \"YES,\" then please provide more detail. Skin cancer (such as Melanoma, Basal Cell Carcinoma, Squamous Cell Carcinoma?  No  Tuberculosis, HIV/AIDS, Hepatitis B or C: No  Radiation Treatment No   HISTORY OF IMMUNOSUPPRESSION:   Do you have a history of any of the following:  Systemic Immunosuppression such as Diabetes, Biologic or Immunotherapy, Chemotherapy, Organ Transplantation, Bone Marrow Transplantation or Prednsione?  No    Answering \"YES\" requires the addition of the dotphrase \"IMMUNOSUPPRESSED\" as the first diagnosis of the patient's visit.   FAMILY HISTORY:  Any \"first degree relatives\" (parent, brother, sister, or child) with the following?    Skin Cancer, Pancreatic or Other Cancer? No   PATIENT EXPERIENCE:    Do you want the Dermatologist to perform a COMPLETE skin exam today including a clinical examination under the \"bra and underwear\" areas?  NO  If necessary, do we have your permission to call and leave a detailed message on your Preferred Phone number that includes your specific medical information?  Yes      Allergies[1] Current Medications[2]        Whom besides the patient is providing clinical information about today's encounter?   NO ADDITIONAL HISTORIAN (patient alone provided history)    Physical Exam and " "Assessment/Plan by Diagnosis:    SEBORRHEIC KERATOSIS; NON-INFLAMED  Physical Exam:  Anatomic Location Affected:    Morphological Description:  Flat and raised, waxy, smooth to warty textured, yellow to brownish-grey to dark brown to blackish, discrete, \"stuck-on\" appearing papules.  Pertinent Positives:  Pertinent Negatives:    Additional History of Present Condition: Patient is being seen for spot of concern on her nose onset 1 month ago. She notes it is a new spot, she denies itchiness, bleeding or any other associated symptoms.     Assessment and Plan:  Based on a thorough discussion of this condition and the management approach to it (including a comprehensive discussion of the known risks, side effects and potential benefits of treatment), the patient (family) agrees to implement the following specific plan:  Reassured benign  Use sun protection.  Apply SPF 30 or higher at least three times a day.  Wear sun protecting clothing and hats.    Seborrheic Keratosis  A seborrheic keratosis is a harmless warty spot that appears during adult life as a common sign of skin aging.  Seborrheic keratoses can arise on any area of skin, covered or uncovered, with the usual exception of the palms and soles. They do not arise from mucous membranes. Seborrheic keratoses can have highly variable appearance.      Seborrheic keratoses are extremely common. It has been estimated that over 90% of adults over the age of 60 years have one or more of them. They occur in males and females of all races, typically beginning to erupt in the 30s or 40s. They are uncommon under the age of 20 years.  The precise cause of seborrhoeic keratoses is not known.  Seborrhoeic keratoses are considered degenerative in nature. As time goes by, seborrheic keratoses tend to become more numerous. Some people inherit a tendency to develop a very large number of them; some people may have hundreds of them.    The name \"seborrheic keratosis\" is misleading, " "because these lesions are not limited to a seborrhoeic distribution (scalp, mid-face, chest, upper back), nor are they formed from sebaceous glands, nor are they associated with sebum -- which is greasy.  Seborrheic keratosis may also be called \"SK,\" \"Seb K,\" \"basal cell papilloma,\" \"senile wart,\" or \"barnacle.\"      Researchers have noted:  Eruptive seborrhoeic keratoses can follow sunburn or dermatitis  Skin friction may be the reason they appear in body folds  Viral cause (e.g., human papillomavirus) seems unlikely  Stable and clonal mutations or activation of FRFR3, PIK3CA, ADELAIDE, AKT1 and EGFR genes are found in seborrhoeic keratoses  Seborrhoeic keratosis can arise from solar lentigo  FRFR3 mutations also arise in solar lentigines. These mutations are associated with increased age and location on the head and neck, suggesting a role of ultraviolet radiation in these lesions  Seborrheic keratoses do not harbour tumour suppressor gene mutations  Epidermal growth factor receptor inhibitors, which are used to treat some cancers, often result in an increase in verrucal (warty) keratoses.    There is no easy way to remove multiple lesions on a single occasion.  Unless a specific lesion is \"inflamed\" and is causing pain or stinging/burning or is bleeding, most insurance companies do not authorize treatment.    ROYA   Physical Exam:  Anatomic Location Affected:  cheeks   Morphological Description:  white papules   Pertinent Positives:  Pertinent Negatives:    Additional History of Present Condition:  noted on exam.     Assessment and Plan:  Based on a thorough discussion of this condition and the management approach to it (including a comprehensive discussion of the known risks, side effects and potential benefits of treatment), the patient (family) agrees to implement the following specific plan:  Reassured benign   Recommend using over the counter retin-A to affected areas on the face nightly.     Scribe Attestation  "     I,:  Ro Fuentes MA am acting as a scribe while in the presence of the attending physician.:       I,:  Basil Vasquez MD personally performed the services described in this documentation    as scribed in my presence.:                [1]   Allergies  Allergen Reactions    Sulfasalazine Hives    Sulfa Antibiotics Eye Swelling, Facial Swelling, Lip Swelling and Rash     Abnormal labs  Wbc drops, neutrophils dropped   lvhcc    Sulfamethoxazole-Trimethoprim Rash     rash,eye, mouth swelling, labs elevated   [2]   Current Outpatient Medications:     alendronate (FOSAMAX) 70 mg tablet, Take 1 tablet (70 mg total) by mouth every 7 days, Disp: 12 tablet, Rfl: 1    levothyroxine 125 mcg tablet, Take 1 tablet (125 mcg total) by mouth daily, Disp: 90 tablet, Rfl: 1    rizatriptan (MAXALT) 5 mg tablet, Take 1 tablet (5 mg total) by mouth once as needed for migraine may repeat in 2 hours if necessary, Disp: 9 tablet, Rfl: 0    meloxicam (MOBIC) 15 mg tablet, Take 1 tablet (15 mg total) by mouth daily as needed for moderate pain (Patient not taking: Reported on 7/22/2025), Disp: 60 tablet, Rfl: 0    metFORMIN (GLUCOPHAGE-XR) 500 mg 24 hr tablet, Take 1 tablet (500 mg total) by mouth daily with breakfast (Patient not taking: Reported on 7/22/2025), Disp: 90 tablet, Rfl: 0

## 2025-07-23 ENCOUNTER — APPOINTMENT (OUTPATIENT)
Dept: LAB | Facility: HOSPITAL | Age: 55
End: 2025-07-23
Attending: NURSE PRACTITIONER
Payer: COMMERCIAL

## 2025-07-23 ENCOUNTER — HOSPITAL ENCOUNTER (OUTPATIENT)
Dept: CT IMAGING | Facility: HOSPITAL | Age: 55
Discharge: HOME | End: 2025-07-23
Attending: NURSE PRACTITIONER

## 2025-07-23 DIAGNOSIS — R00.0 TACHYCARDIA: ICD-10-CM

## 2025-07-23 DIAGNOSIS — R00.2 PALPITATIONS: ICD-10-CM

## 2025-07-23 DIAGNOSIS — E03.9 HYPOTHYROIDISM, UNSPECIFIED TYPE: ICD-10-CM

## 2025-07-23 DIAGNOSIS — E78.2 MIXED HYPERLIPIDEMIA: ICD-10-CM

## 2025-07-23 LAB
T4 FREE SERPL-MCNC: 1.11 NG/DL (ref 0.61–1.12)
TSH SERPL DL<=0.05 MIU/L-ACNC: 1.2 UIU/ML (ref 0.45–4.5)

## 2025-07-23 PROCEDURE — 84443 ASSAY THYROID STIM HORMONE: CPT

## 2025-07-23 PROCEDURE — 84439 ASSAY OF FREE THYROXINE: CPT

## 2025-07-23 PROCEDURE — 36415 COLL VENOUS BLD VENIPUNCTURE: CPT

## 2025-07-25 ENCOUNTER — HOSPITAL ENCOUNTER (OUTPATIENT)
Facility: AMBULARY SURGERY CENTER | Age: 55
Setting detail: OUTPATIENT SURGERY
Discharge: HOME | End: 2025-07-25
Attending: ORTHOPAEDIC SURGERY | Admitting: ORTHOPAEDIC SURGERY
Payer: COMMERCIAL

## 2025-07-25 ENCOUNTER — ANESTHESIA (OUTPATIENT)
Dept: PERIOP | Facility: AMBULARY SURGERY CENTER | Age: 55
End: 2025-07-25
Payer: COMMERCIAL

## 2025-07-25 VITALS
OXYGEN SATURATION: 97 % | SYSTOLIC BLOOD PRESSURE: 115 MMHG | BODY MASS INDEX: 20.89 KG/M2 | TEMPERATURE: 96.8 F | WEIGHT: 125.4 LBS | HEART RATE: 64 BPM | DIASTOLIC BLOOD PRESSURE: 70 MMHG | HEIGHT: 65 IN | RESPIRATION RATE: 18 BRPM

## 2025-07-25 DIAGNOSIS — S46.811A FULL THICKNESS TEAR OF RIGHT SUBSCAPULARIS TENDON, INITIAL ENCOUNTER: Primary | ICD-10-CM

## 2025-07-25 PROBLEM — E11.9 DIABETES MELLITUS, TYPE 2 (HCC): Status: ACTIVE | Noted: 2025-07-25

## 2025-07-25 LAB
GLUCOSE SERPL-MCNC: 144 MG/DL (ref 65–140)
GLUCOSE SERPL-MCNC: 97 MG/DL (ref 65–140)

## 2025-07-25 PROCEDURE — 29827 SHO ARTHRS SRG RT8TR CUF RPR: CPT | Performed by: ORTHOPAEDIC SURGERY

## 2025-07-25 PROCEDURE — 82948 REAGENT STRIP/BLOOD GLUCOSE: CPT

## 2025-07-25 PROCEDURE — 29823 SHO ARTHRS SRG XTNSV DBRDMT: CPT | Performed by: ORTHOPAEDIC SURGERY

## 2025-07-25 PROCEDURE — C1713 ANCHOR/SCREW BN/BN,TIS/BN: HCPCS | Performed by: ORTHOPAEDIC SURGERY

## 2025-07-25 PROCEDURE — 29828 SHO ARTHRS SRG BICP TENODSIS: CPT | Performed by: ORTHOPAEDIC SURGERY

## 2025-07-25 PROCEDURE — NC001 PR NO CHARGE: Performed by: ORTHOPAEDIC SURGERY

## 2025-07-25 PROCEDURE — 29826 SHO ARTHRS SRG DECOMPRESSION: CPT | Performed by: ORTHOPAEDIC SURGERY

## 2025-07-25 DEVICE — IMPLANTABLE DEVICE: Type: IMPLANTABLE DEVICE | Site: SHOULDER | Status: FUNCTIONAL

## 2025-07-25 RX ORDER — ONDANSETRON 2 MG/ML
INJECTION INTRAMUSCULAR; INTRAVENOUS AS NEEDED
Status: DISCONTINUED | OUTPATIENT
Start: 2025-07-25 | End: 2025-07-25

## 2025-07-25 RX ORDER — LIDOCAINE HYDROCHLORIDE 10 MG/ML
INJECTION, SOLUTION EPIDURAL; INFILTRATION; INTRACAUDAL; PERINEURAL AS NEEDED
Status: DISCONTINUED | OUTPATIENT
Start: 2025-07-25 | End: 2025-07-25

## 2025-07-25 RX ORDER — FENTANYL CITRATE 50 UG/ML
INJECTION, SOLUTION INTRAMUSCULAR; INTRAVENOUS
Status: COMPLETED | OUTPATIENT
Start: 2025-07-25 | End: 2025-07-25

## 2025-07-25 RX ORDER — CEFAZOLIN SODIUM 2 G/50ML
2000 SOLUTION INTRAVENOUS ONCE
Status: COMPLETED | OUTPATIENT
Start: 2025-07-25 | End: 2025-07-25

## 2025-07-25 RX ORDER — CHLORHEXIDINE GLUCONATE ORAL RINSE 1.2 MG/ML
15 SOLUTION DENTAL ONCE
Status: COMPLETED | OUTPATIENT
Start: 2025-07-25 | End: 2025-07-25

## 2025-07-25 RX ORDER — ONDANSETRON 2 MG/ML
4 INJECTION INTRAMUSCULAR; INTRAVENOUS ONCE AS NEEDED
Status: DISCONTINUED | OUTPATIENT
Start: 2025-07-25 | End: 2025-07-25 | Stop reason: HOSPADM

## 2025-07-25 RX ORDER — ACETAMINOPHEN 325 MG/1
650 TABLET ORAL EVERY 6 HOURS PRN
Status: CANCELLED | OUTPATIENT
Start: 2025-07-25

## 2025-07-25 RX ORDER — PROPOFOL 10 MG/ML
INJECTION, EMULSION INTRAVENOUS AS NEEDED
Status: DISCONTINUED | OUTPATIENT
Start: 2025-07-25 | End: 2025-07-25

## 2025-07-25 RX ORDER — DEXAMETHASONE SODIUM PHOSPHATE 10 MG/ML
INJECTION, SOLUTION INTRAMUSCULAR; INTRAVENOUS AS NEEDED
Status: DISCONTINUED | OUTPATIENT
Start: 2025-07-25 | End: 2025-07-25

## 2025-07-25 RX ORDER — SODIUM CHLORIDE, SODIUM LACTATE, POTASSIUM CHLORIDE, CALCIUM CHLORIDE 600; 310; 30; 20 MG/100ML; MG/100ML; MG/100ML; MG/100ML
20 INJECTION, SOLUTION INTRAVENOUS CONTINUOUS
Status: DISCONTINUED | OUTPATIENT
Start: 2025-07-25 | End: 2025-07-25 | Stop reason: HOSPADM

## 2025-07-25 RX ORDER — OXYCODONE HYDROCHLORIDE 5 MG/1
5 TABLET ORAL EVERY 4 HOURS PRN
Refills: 0 | Status: DISCONTINUED | OUTPATIENT
Start: 2025-07-25 | End: 2025-07-25 | Stop reason: HOSPADM

## 2025-07-25 RX ORDER — MIDAZOLAM HYDROCHLORIDE 2 MG/2ML
INJECTION, SOLUTION INTRAMUSCULAR; INTRAVENOUS
Status: COMPLETED | OUTPATIENT
Start: 2025-07-25 | End: 2025-07-25

## 2025-07-25 RX ORDER — EPHEDRINE SULFATE 50 MG/ML
INJECTION INTRAVENOUS AS NEEDED
Status: DISCONTINUED | OUTPATIENT
Start: 2025-07-25 | End: 2025-07-25

## 2025-07-25 RX ORDER — BUPIVACAINE HYDROCHLORIDE 5 MG/ML
INJECTION, SOLUTION EPIDURAL; INTRACAUDAL; PERINEURAL
Status: COMPLETED | OUTPATIENT
Start: 2025-07-25 | End: 2025-07-25

## 2025-07-25 RX ORDER — OXYCODONE HYDROCHLORIDE 5 MG/1
5 TABLET ORAL EVERY 8 HOURS PRN
Qty: 13 TABLET | Refills: 0 | Status: SHIPPED | OUTPATIENT
Start: 2025-07-25

## 2025-07-25 RX ORDER — ONDANSETRON 2 MG/ML
4 INJECTION INTRAMUSCULAR; INTRAVENOUS EVERY 6 HOURS PRN
Status: CANCELLED | OUTPATIENT
Start: 2025-07-25

## 2025-07-25 RX ORDER — FENTANYL CITRATE/PF 50 MCG/ML
25 SYRINGE (ML) INJECTION
Status: DISCONTINUED | OUTPATIENT
Start: 2025-07-25 | End: 2025-07-25 | Stop reason: HOSPADM

## 2025-07-25 RX ADMIN — EPHEDRINE SULFATE 5 MG: 50 INJECTION INTRAVENOUS at 09:58

## 2025-07-25 RX ADMIN — BUPIVACAINE HYDROCHLORIDE 7 ML: 5 INJECTION, SOLUTION EPIDURAL; INTRACAUDAL; PERINEURAL at 09:19

## 2025-07-25 RX ADMIN — MIDAZOLAM 1 MG: 1 INJECTION INTRAMUSCULAR; INTRAVENOUS at 09:38

## 2025-07-25 RX ADMIN — MIDAZOLAM 1 MG: 1 INJECTION INTRAMUSCULAR; INTRAVENOUS at 09:19

## 2025-07-25 RX ADMIN — EPHEDRINE SULFATE 5 MG: 50 INJECTION INTRAVENOUS at 10:04

## 2025-07-25 RX ADMIN — DEXAMETHASONE SODIUM PHOSPHATE 10 MG: 10 INJECTION INTRAMUSCULAR; INTRAVENOUS at 09:42

## 2025-07-25 RX ADMIN — PROPOFOL 140 MG: 10 INJECTION, EMULSION INTRAVENOUS at 09:42

## 2025-07-25 RX ADMIN — LIDOCAINE HYDROCHLORIDE 30 MG: 10 INJECTION, SOLUTION EPIDURAL; INFILTRATION; INTRACAUDAL; PERINEURAL at 09:42

## 2025-07-25 RX ADMIN — BUPIVACAINE 20 ML: 13.3 INJECTION, SUSPENSION, LIPOSOMAL INFILTRATION at 09:19

## 2025-07-25 RX ADMIN — EPHEDRINE SULFATE 5 MG: 50 INJECTION INTRAVENOUS at 10:11

## 2025-07-25 RX ADMIN — CHLORHEXIDINE GLUCONATE 15 ML: 1.2 SOLUTION ORAL at 08:46

## 2025-07-25 RX ADMIN — SODIUM CHLORIDE, SODIUM LACTATE, POTASSIUM CHLORIDE, AND CALCIUM CHLORIDE: .6; .31; .03; .02 INJECTION, SOLUTION INTRAVENOUS at 08:51

## 2025-07-25 RX ADMIN — FENTANYL CITRATE 50 MCG: 50 INJECTION INTRAMUSCULAR; INTRAVENOUS at 09:19

## 2025-07-25 RX ADMIN — FENTANYL CITRATE 25 MCG: 50 INJECTION INTRAMUSCULAR; INTRAVENOUS at 10:42

## 2025-07-25 RX ADMIN — FENTANYL CITRATE 25 MCG: 50 INJECTION INTRAMUSCULAR; INTRAVENOUS at 10:32

## 2025-07-25 RX ADMIN — CEFAZOLIN SODIUM 2000 MG: 2 SOLUTION INTRAVENOUS at 09:38

## 2025-07-25 RX ADMIN — ONDANSETRON 4 MG: 2 INJECTION INTRAMUSCULAR; INTRAVENOUS at 09:42

## 2025-07-25 NOTE — ANESTHESIA PREPROCEDURE EVALUATION
Procedure:  REPAIR ROTATOR CUFF  ARTHROSCOPIC POSSIBLE BICEPS TENODESIS (Right: Shoulder)    Relevant Problems   ANESTHESIA (within normal limits)      CARDIO (within normal limits)   (+) Migraine without aura, not refractory   (+) Raynaud's disease without gangrene      ENDO   (+) Diabetes mellitus, type 2 (HCC) (New dx)   (+) Hypothyroidism      NEURO/PSYCH   (+) Migraine without aura, not refractory      PULMONARY (within normal limits)  Nonsmoker/no marijuana product use    (-) Sleep apnea   (-) URI (upper respiratory infection)      Physical Exam    Airway     Mallampati score: I  TM Distance: >3 FB  Neck ROM: full  Mouth opening: >= 4 cm      Cardiovascular      Dental   No notable dental hx     Pulmonary      Neurological      Other Findings  post-pubertal.    Lab Results   Component Value Date    WBC 4.61 05/15/2025    HGB 13.5 05/15/2025     05/15/2025     Lab Results   Component Value Date    SODIUM 143 05/15/2025    K 4.1 05/15/2025    BUN 12 05/15/2025    CREATININE 0.87 05/15/2025    EGFR 74 05/15/2025     Lab Results   Component Value Date    HGBA1C 6.5 (H) 05/15/2025     07/22/25 0741   Echo complete w/ contrast if indicated (Final result)  Narrative  •  Left Ventricle: Left ventricular cavity size is normal. Wall thickness  is normal. The left ventricular ejection fraction is 62%. Systolic  function is normal. Wall motion is normal. Diastolic function is normal.  •  Right Ventricle: Right ventricular cavity size is normal. Systolic  function is normal.  •  Left Atrium: The atrium is normal in size.  •  Right Atrium: The atrium is normal in size.     Overall normal biventricular size and function  No major valvular abnormalities     07/16/25 0831   POCT ECG (Final result)     Impression  Normal sinus rhythm; normal ECG           Anesthesia Plan  ASA Score- 2     Anesthesia Type- general and regional with ASA Monitors.         Additional Monitors:     Airway Plan: LMA and LMA.           Plan  Factors-Exercise tolerance (METS): >4 METS.    Chart reviewed.   Existing labs reviewed. Patient summary reviewed.    Patient is not a current smoker.              Induction- intravenous.    Postoperative Plan- Plan for postoperative opioid use.   Monitoring Plan - Monitoring plan - standard ASA monitoring  Post Operative Pain Plan - plan for postoperative opioid use and peripheral nerve block        Informed Consent- Anesthetic plan and risks discussed with patient and spouse.  I personally reviewed this patient with the CRNA. Discussed and agreed on the Anesthesia Plan with the CRNA..      NPO Status:  No vitals data found for the desired time range.

## 2025-07-25 NOTE — ANESTHESIA POSTPROCEDURE EVALUATION
Post-Op Assessment Note    CV Status:  Stable  Pain Score: 0    Pain management: adequate       Mental Status:  Alert and awake   Hydration Status:  Euvolemic   PONV Controlled:  Controlled   Airway Patency:  Patent     Post Op Vitals Reviewed: Yes    No anethesia notable event occurred.    Staff: Anesthesiologist, with CRNAs           Last Filed PACU Vitals:  Vitals Value Taken Time   Temp 97.2 °F (36.2 °C) 07/25/25 11:05   Pulse 67 07/25/25 11:10   /69 07/25/25 11:05   Resp 15 07/25/25 11:10   SpO2 97 % 07/25/25 11:10   Vitals shown include unfiled device data.    Modified Nidhi:     Vitals Value Taken Time   Activity 2 07/25/25 11:00   Respiration 2 07/25/25 11:00   Circulation 2 07/25/25 11:00   Consciousness 2 07/25/25 11:00   Oxygen Saturation 2 07/25/25 11:00     Modified Nidhi Score: 10

## 2025-07-25 NOTE — H&P
I identified and marked the patient in the pre-op holding area after confirming the surgical consent.  No changes to medical health since the H&P was preformed.     The patient's prescription history [facility] to ensure compliance with applicable state laws.             Assessment & Plan  Full thickness tear of right subscapularis tendon, initial encounter     The patient does have a small full-thickness tear of her subscapularis and at least on examination appears to have long head biceps tendon instability, this is not seen on the MRI but as a dynamic problem we do not always capture this on an MRI.  Regardless given her persistence of symptoms and the severity of the symptoms, the impact upon her activities of daily living and the relatively acute onset of nature she is indicated for arthroscopic repair of the rotator cuff and likely long head biceps tenodesis.  She has started physical therapy so she will continue to do so as she leads up to the procedure and if her symptoms improve she can consider avoiding the surgery but I do think she will end up having this fixed to improve her overall outcome.     A thorough discussion was performed with the patient regarding the risks and benefit of operative and nonoperative treatment of their rotator cuff tear.  Risks discussed include but were not limited to infection, neurovascular injury, recurrent tear, nonhealing of the repair, need for further surgery, need for biceps tenodesis or tenotomy, stiffness, need for prolonged rehabilitation, as well as the risk of anesthesia.  After this discussion all questions were answered and informed consent was obtained in the office for arthroscopic rotator cuff repair of the right shoulder.  The patient will be scheduled for this procedure accordingly.      Patient's daughter is getting  in October and the bachelorette party is in July.  Patient plans on have surgery when she  "returns from the [employer] party so she is out of the sling for the wedding.  Discussed she can continue PT in the mean-time.     Orders:    Arc 2.0    Ambulatory Referral to Physical Therapy; Future        Subjective:   Patient ID: Yesenia Newberry is a 54 y.o. female        HPI  The patient presents with a chief complaint of right shoulder pain.   The pain began 3 month(s) ago and is associated with an acute injury.  Patient reports on 3/17/25 she was hold back her daughter's rotweiler who was after her cat.  She did have an injury a year ago moving a patient however symptoms had resolved until this recent injury. She was seen and evaluated by Dr Oates who ordered a MRI and later provided with patient with a CS injection and referral to PT.  Patient reports some improvement with the injection and she has just started therapy.  The patient describes the pain as aching, dull, and sharp in intensity,  intermittent in timing, and localizes the pain to the  right globally.  The pain is worse with movement and raising arm over head and relieved by rest.  The pain is not associated with numbness and tingling.  The pain is not associated with constitutional symptoms. The patient is awoken at night by the pain.          The following portions of the patient's history were reviewed and updated as appropriate: allergies, current medications, past family history, past medical history, past social history, past surgical history and problem list.        Objective:  /87   Pulse 78   Temp (!) 97 °F (36.1 °C) (Temporal)   Resp 18   Ht 5' 5\" (1.651 m)   Wt 56.9 kg (125 lb 6.4 oz)   SpO2 99%   BMI 20.87 kg/m²           Right Shoulder Exam      Range of Motion   External rotation:  80   Forward flexion:  120 (PROM 170)   Right shoulder internal rotation 0 degrees: buttocks.      Muscle Strength   External rotation: 5/5   Supraspinatus: 4/5   Subscapularis: 3/5      Tests   Kathleen test: positive  Impingement: " positive     Other   Erythema: absent  Sensation: normal  Pulse: present                 Physical Exam  Vitals and nursing note reviewed.   Constitutional:       Appearance: She is well-developed.   HENT:      Head: Normocephalic and atraumatic.      Eyes:      Pupils: Pupils are equal, round, and reactive to light.         Cardiovascular:      Rate and Rhythm: Normal rate and regular rhythm.      Pulses: Normal pulses.      Heart sounds: Normal heart sounds.   Pulmonary:      Effort: Pulmonary effort is normal. No respiratory distress.      Breath sounds: Normal breath sounds.   Abdominal:      General: Abdomen is flat. There is no distension.      Palpations: Abdomen is soft.      Musculoskeletal:      Cervical back: Normal range of motion and neck supple.      Skin:     General: Skin is warm and dry.      Neurological:      Mental Status: She is alert and oriented to person, place, and time.      Psychiatric:         Mood and Affect: Mood normal.         Behavior: Behavior normal.         Thought Content: Thought content normal.         Judgment: Judgment normal.               I have personally reviewed pertinent films in PACS and my interpretation is as follows.     Right shoulder MRI demonstrates full thickness tear inferior subscapularis, supraspinatus and infraspinatus tendonitis, no biceps instability seen

## 2025-07-25 NOTE — ANESTHESIA POSTPROCEDURE EVALUATION
Post-Op Assessment Note    CV Status:  Stable  Pain Score: 0    Pain management: adequate       Mental Status:  Sleepy and arousable   PONV Controlled:  None   Airway Patency:  Patent     Post Op Vitals Reviewed: Yes    No anethesia notable event occurred.    Staff: CRNA           Last Filed PACU Vitals:  Vitals Value Taken Time   Temp 97    Pulse 79 07/25/25 10:49   /72 07/25/25 10:50   Resp 14    SpO2 94 % 07/25/25 10:49   Vitals shown include unfiled device data.

## 2025-07-25 NOTE — ANESTHESIA PROCEDURE NOTES
Peripheral Block    Patient location during procedure: holding area  Start time: 7/25/2025 9:19 AM  Reason for block: at surgeon's request and post-op pain management  Staffing  Performed by: Syl Ga MD  Authorized by: Syl Ga MD    Preanesthetic Checklist  Completed: patient identified, IV checked, site marked, risks and benefits discussed, surgical consent, monitors and equipment checked, pre-op evaluation and timeout performed  Peripheral Block  Patient position: sitting  Prep: ChloraPrep  Patient monitoring: frequent blood pressure checks, continuous pulse oximetry and heart rate  Block type: Interscalene  Laterality: right  Injection technique: single-shot  Procedures: ultrasound guided, Ultrasound guidance required for the procedure to increase accuracy and safety of medication placement and decrease risk of complications.  Ultrasound permanent image saved  bupivacaine (PF) (MARCAINE) 0.5 % injection 20 mL - Perineural   7 mL - 7/25/2025 9:19:00 AM  bupivacaine liposomal (EXPAREL) 1.3 % injection 20 mL - Perineural   20 mL - 7/25/2025 9:19:00 AM  midazolam (VERSED) injection 0.5 mg - Intravenous   1 mg - 7/25/2025 9:19:00 AM  fentanyl citrate (PF) 100 MCG/2ML 50 mcg - Intravenous   50 mcg - 7/25/2025 9:19:00 AM (lido 1% 0.5 ml to skin)  Needle  Needle type: Stimuplex   Needle gauge: 22 G  Needle length: 2 in  Needle localization: anatomical landmarks and ultrasound guidance  Assessment  Injection assessment: incremental injection, frequent aspiration, injected with ease, negative aspiration, negative for heart rate change, no paresthesia on injection, no symptoms of intraneural/intravenous injection and needle tip visualized at all times  Paresthesia pain: none  Post-procedure:  site cleaned  patient tolerated the procedure well with no immediate complications  Additional Notes  This has been fully explained to the patient, who indicates understanding.  Spouse present, pt comfortable and  relaxed

## 2025-07-25 NOTE — OP NOTE
OPERATIVE REPORT  PATIENT NAME: Yesenia Newberry    :  1970  MRN: [id number]  Pt Location: [facility] OR ROOM 06    SURGERY DATE: 2025     SURGEON: Ash Heredia MD     ASSISTANT: Flavia Ng PA-C     NOTE: Flavia Ng PA-C was present throughout the entire procedure and performed essential assistance with patient prepping, draping, positioning, suture management, wound closure, sterile dressing application and sling application, all under my direct supervision.     : Ramírez Velasquez MD PGY-2 Assisted in the procedure.  I, Ash Heredia MD, was present for the entire procedure and was scrubbed for and performed all the key and essential components of the procedure.     PREOPERATIVE DIAGNOSIS:  Right Shoulder Subscapularis Tear    POSTOPERATIVE DIAGNOSIS: Right Shoulder Supraspinatus Tear and Long Head Biceps Tendon Partial Tear    PROCEDURES: Surgical Arthroscopy Right Shoulder with Rotator Cuff Repair, Long Head Biceps Tenodesis, Extensive Debridement, and Subacromial Decompression    ANESTHESIA STAFF: Syl Ga MD     ANESTHESIA TYPE: General LMA with ultrasound guided interscalene block (Exparel). The interscalene block was provided by the anesthesia staff per my request for postoperative pain control and to decrease the use of postoperative narcotic medication for pain control.    COMPLICATIONS: None    FINDINGS: Supraspinatus Tear, Long Head Biceps Tendon Partial Tear, and Mild Glenohumeral Osteoarthritis    SPECIMEN(S):  None    ESTIMATED BLOOD LOSS: Minimal    INDICATION:  Briefly, the patient is a 54 y.o.  female with right shoulder pain. MRI scan confirmed a suspected inferior border subscapularis tear. The patient elected for arthroscopic treatment. Informed consent was obtained after a thorough discussion of the risks and benefits of the procedure, as well as alternatives to the procedure.     OPERATIVE TECHNIQUE:  On the day of surgery, I  identified the patient’s right shoulder and marked it with my initials. The patient was taken to the operating room where anesthesia was induced and 2 grams of IV Cefazolin were given. The patient was examined in the supine position and was found to have full range of motion of the right shoulder with no instability. The patient was then positioned in the semilateral [facility] position. All bony prominences were padded. The shoulder was prepped and draped in normal sterile fashion. After a time-out for safety, a standard posterior arthroscopic portal was made. Glenohumeral evaluation revealed early degenerative changes of the humeral head and the glenoid with a small area of full-thickness cartilage loss of the central portion of the glenoid near the bare area, there was an associated flap of cartilage in this location.  There was degenerative tearing of the circumferential glenoid labrum including the anterior posterior and superior labrum consistent with preoperative imaging.  There was a high-grade partial tear of the long head biceps which was not identified on preoperative imaging and there was a near full-thickness high-grade articular sided partial-thickness supraspinatus tendon tear also not identified on preoperative imaging.  The subscapularis was intact I saw no evidence of partial tear or disruption of the tendon fibers inserting onto the lesser tuberosity.  There was a significant mount of intra-articular synovitis as well.  No loose bodies were seen in the axillary pouch or subscap recess.  An anterior cannula was established and extensive debridement of the glenohumeral joint was performed including the anterior and posterior glenoid labrum, the superior glenoid labrum, the articular cartilage changes of the humeral head and the articular cartilage changes of the glenoid including the small chondral flap at the central portion.  Next the long head biceps was indicated for tenodesis given  the partial tearing so an Arthrex 2.6 mm all suture anchor was placed at the superior aspect lesser tuberosity, a loop and whipstitch was placed through and around the long head of biceps which was then released and tied off to the lesser tuberosity performing our intra-articular long head biceps tenodesis.  The supraspinatus tendon tear uncovered almost 1 cm of the footprint and therefore was indicated for repair so this was tagged with a PDS stitch for later evaluation on the subacromial side.   A synovectomy was also performed of the glenohumeral joint to remove the synovitis seen.  After the intra-articular work was completed, the scope was then placed in the subacromial space through a posterolateral portal where a thorough bursectomy was performed. The PDS stitch was identified and the few remaining bursal sided fibers were released converting this to a full-thickness tear to allow for complete repair.  After release the full-thickness supraspinatus tendon tear measured approximate 1 cm from anterior posterior and was able to be easily reduced to the tuberosity.  The tuberosity was prepared in routine fashion and a repair of the supraspinatus was performed using a single double loaded Arthrex 2.6 mm all suture anchor, a single this from each limb was placed across the tear and tied down reducing the entire anterior posterior extent of the tear anatomically to the tuberosity.  The CA ligament was frayed so it was released off the anterolateral edge of acromion and a gentle acromioplasty was performed. The area was then irrigated. Scope was withdrawn. Wounds were closed with 4-0 Monocryl and Histoacryl. Sterile dressings and a sling with an abduction pillow was placed. The patient was awoken without complication and returned to the recovery room in good condition. We will see the patient back in the office next week to initiate therapy following accelerated rotator cuff repair rehabilitation protocol. At the end  of procedure, the counts were correct.     PATIENT DISPOSITION:  Stable to PACU      SIGNATURE: Ash Heredia MD  DATE: July 25, 2025  TIME: 10:45 AM

## 2025-07-28 ENCOUNTER — OFFICE VISIT (OUTPATIENT)
Dept: PHYSICAL THERAPY | Facility: CLINIC | Age: 55
End: 2025-07-28
Attending: PHYSICAL MEDICINE & REHABILITATION
Payer: COMMERCIAL

## 2025-07-28 ENCOUNTER — OFFICE VISIT (OUTPATIENT)
Dept: OBGYN CLINIC | Facility: OTHER | Age: 55
End: 2025-07-28

## 2025-07-28 VITALS — HEIGHT: 65 IN | BODY MASS INDEX: 20.83 KG/M2 | WEIGHT: 125 LBS

## 2025-07-28 DIAGNOSIS — Z98.890 S/P RIGHT ROTATOR CUFF REPAIR: Primary | ICD-10-CM

## 2025-07-28 PROCEDURE — 97110 THERAPEUTIC EXERCISES: CPT | Performed by: PHYSICAL THERAPIST

## 2025-07-28 PROCEDURE — 97112 NEUROMUSCULAR REEDUCATION: CPT | Performed by: PHYSICAL THERAPIST

## 2025-07-28 PROCEDURE — 99024 POSTOP FOLLOW-UP VISIT: CPT | Performed by: PHYSICIAN ASSISTANT

## 2025-07-28 PROCEDURE — 97161 PT EVAL LOW COMPLEX 20 MIN: CPT | Performed by: PHYSICAL THERAPIST

## 2025-08-04 ENCOUNTER — OFFICE VISIT (OUTPATIENT)
Dept: PHYSICAL THERAPY | Facility: CLINIC | Age: 55
End: 2025-08-04
Attending: PHYSICAL MEDICINE & REHABILITATION
Payer: COMMERCIAL

## 2025-08-04 DIAGNOSIS — Z98.890 S/P RIGHT ROTATOR CUFF REPAIR: Primary | ICD-10-CM

## 2025-08-04 PROCEDURE — 97140 MANUAL THERAPY 1/> REGIONS: CPT

## 2025-08-04 PROCEDURE — 97110 THERAPEUTIC EXERCISES: CPT

## 2025-08-04 PROCEDURE — 97112 NEUROMUSCULAR REEDUCATION: CPT

## 2025-08-05 ENCOUNTER — HOSPITAL ENCOUNTER (OUTPATIENT)
Dept: NON INVASIVE DIAGNOSTICS | Facility: CLINIC | Age: 55
Discharge: HOME | End: 2025-08-05
Attending: NURSE PRACTITIONER
Payer: COMMERCIAL

## 2025-08-05 DIAGNOSIS — R00.2 PALPITATIONS: ICD-10-CM

## 2025-08-05 DIAGNOSIS — R07.9 CHEST PAIN, UNSPECIFIED TYPE: ICD-10-CM

## 2025-08-05 DIAGNOSIS — R00.0 TACHYCARDIA: ICD-10-CM

## 2025-08-05 PROCEDURE — 93225 XTRNL ECG REC<48 HRS REC: CPT

## 2025-08-05 PROCEDURE — 93226 XTRNL ECG REC<48 HR SCAN A/R: CPT

## 2025-08-11 ENCOUNTER — OFFICE VISIT (OUTPATIENT)
Dept: PHYSICAL THERAPY | Facility: CLINIC | Age: 55
End: 2025-08-11
Attending: PHYSICAL MEDICINE & REHABILITATION
Payer: COMMERCIAL

## 2025-08-13 PROCEDURE — 93227 XTRNL ECG REC<48 HR R&I: CPT | Performed by: INTERNAL MEDICINE

## (undated) DEVICE — PACK PBDS SHOULDER ARTHROSCOPY RF

## (undated) DEVICE — Device